# Patient Record
Sex: MALE | Race: WHITE | Employment: FULL TIME | URBAN - METROPOLITAN AREA
[De-identification: names, ages, dates, MRNs, and addresses within clinical notes are randomized per-mention and may not be internally consistent; named-entity substitution may affect disease eponyms.]

---

## 2021-09-30 ENCOUNTER — APPOINTMENT (OUTPATIENT)
Dept: CT IMAGING | Age: 31
End: 2021-09-30
Attending: EMERGENCY MEDICINE
Payer: COMMERCIAL

## 2021-09-30 ENCOUNTER — APPOINTMENT (OUTPATIENT)
Dept: MRI IMAGING | Age: 31
End: 2021-09-30
Attending: EMERGENCY MEDICINE
Payer: COMMERCIAL

## 2021-09-30 ENCOUNTER — HOSPITAL ENCOUNTER (OUTPATIENT)
Age: 31
Setting detail: OBSERVATION
Discharge: HOME | End: 2021-10-02
Attending: EMERGENCY MEDICINE | Admitting: FAMILY MEDICINE
Payer: COMMERCIAL

## 2021-09-30 DIAGNOSIS — R20.0 LEFT ARM NUMBNESS: Primary | ICD-10-CM

## 2021-09-30 DIAGNOSIS — Z82.41 FAMILY HISTORY OF SUDDEN CARDIAC DEATH IN FATHER: ICD-10-CM

## 2021-09-30 DIAGNOSIS — I63.9 CEREBROVASCULAR ACCIDENT (CVA), UNSPECIFIED MECHANISM (HCC): ICD-10-CM

## 2021-09-30 PROBLEM — G45.9 TIA (TRANSIENT ISCHEMIC ATTACK): Status: ACTIVE | Noted: 2021-09-30

## 2021-09-30 LAB
ALBUMIN SERPL-MCNC: 4.3 G/DL (ref 3.5–5)
ALBUMIN/GLOB SERPL: 1.2 {RATIO} (ref 1.1–2.2)
ALP SERPL-CCNC: 69 U/L (ref 45–117)
ALT SERPL-CCNC: 36 U/L (ref 12–78)
ANION GAP SERPL CALC-SCNC: 5 MMOL/L (ref 5–15)
AST SERPL-CCNC: 18 U/L (ref 15–37)
BASOPHILS # BLD: 0 K/UL (ref 0–0.1)
BASOPHILS NFR BLD: 0 % (ref 0–1)
BILIRUB SERPL-MCNC: 0.4 MG/DL (ref 0.2–1)
BUN SERPL-MCNC: 14 MG/DL (ref 6–20)
BUN/CREAT SERPL: 12 (ref 12–20)
CALCIUM SERPL-MCNC: 9.4 MG/DL (ref 8.5–10.1)
CHLORIDE SERPL-SCNC: 107 MMOL/L (ref 97–108)
CK SERPL-CCNC: 131 U/L (ref 39–308)
CK SERPL-CCNC: 150 U/L (ref 39–308)
CK SERPL-CCNC: 91 U/L (ref 39–308)
CO2 SERPL-SCNC: 28 MMOL/L (ref 21–32)
COMMENT, HOLDF: NORMAL
CREAT SERPL-MCNC: 1.19 MG/DL (ref 0.7–1.3)
CRP SERPL-MCNC: <0.29 MG/DL (ref 0–0.6)
DIFFERENTIAL METHOD BLD: ABNORMAL
EOSINOPHIL # BLD: 0.4 K/UL (ref 0–0.4)
EOSINOPHIL NFR BLD: 4 % (ref 0–7)
ERYTHROCYTE [DISTWIDTH] IN BLOOD BY AUTOMATED COUNT: 12.7 % (ref 11.5–14.5)
ERYTHROCYTE [SEDIMENTATION RATE] IN BLOOD: 4 MM/HR (ref 0–15)
GLOBULIN SER CALC-MCNC: 3.7 G/DL (ref 2–4)
GLUCOSE BLD STRIP.AUTO-MCNC: 109 MG/DL (ref 65–117)
GLUCOSE SERPL-MCNC: 110 MG/DL (ref 65–100)
HCT VFR BLD AUTO: 45.3 % (ref 36.6–50.3)
HGB BLD-MCNC: 14.7 G/DL (ref 12.1–17)
IMM GRANULOCYTES # BLD AUTO: 0 K/UL (ref 0–0.04)
IMM GRANULOCYTES NFR BLD AUTO: 0 % (ref 0–0.5)
LYMPHOCYTES # BLD: 4 K/UL (ref 0.8–3.5)
LYMPHOCYTES NFR BLD: 45 % (ref 12–49)
MAGNESIUM SERPL-MCNC: 2.1 MG/DL (ref 1.6–2.4)
MCH RBC QN AUTO: 28.5 PG (ref 26–34)
MCHC RBC AUTO-ENTMCNC: 32.5 G/DL (ref 30–36.5)
MCV RBC AUTO: 87.8 FL (ref 80–99)
MONOCYTES # BLD: 0.5 K/UL (ref 0–1)
MONOCYTES NFR BLD: 5 % (ref 5–13)
NEUTS SEG # BLD: 4 K/UL (ref 1.8–8)
NEUTS SEG NFR BLD: 46 % (ref 32–75)
NRBC # BLD: 0 K/UL (ref 0–0.01)
NRBC BLD-RTO: 0 PER 100 WBC
PLATELET # BLD AUTO: 276 K/UL (ref 150–400)
PMV BLD AUTO: 11.4 FL (ref 8.9–12.9)
POTASSIUM SERPL-SCNC: 3.3 MMOL/L (ref 3.5–5.1)
PROT SERPL-MCNC: 8 G/DL (ref 6.4–8.2)
RBC # BLD AUTO: 5.16 M/UL (ref 4.1–5.7)
SAMPLES BEING HELD,HOLD: NORMAL
SERVICE CMNT-IMP: NORMAL
SODIUM SERPL-SCNC: 140 MMOL/L (ref 136–145)
TROPONIN I SERPL-MCNC: <0.05 NG/ML
TROPONIN I SERPL-MCNC: <0.05 NG/ML
WBC # BLD AUTO: 8.9 K/UL (ref 4.1–11.1)

## 2021-09-30 PROCEDURE — APPNB30 APP NON BILLABLE TIME 0-30 MINS: Performed by: NURSE PRACTITIONER

## 2021-09-30 PROCEDURE — 97165 OT EVAL LOW COMPLEX 30 MIN: CPT | Performed by: OCCUPATIONAL THERAPIST

## 2021-09-30 PROCEDURE — 82550 ASSAY OF CK (CPK): CPT

## 2021-09-30 PROCEDURE — A9576 INJ PROHANCE MULTIPACK: HCPCS

## 2021-09-30 PROCEDURE — 97116 GAIT TRAINING THERAPY: CPT

## 2021-09-30 PROCEDURE — 72156 MRI NECK SPINE W/O & W/DYE: CPT

## 2021-09-30 PROCEDURE — 92610 EVALUATE SWALLOWING FUNCTION: CPT

## 2021-09-30 PROCEDURE — 36415 COLL VENOUS BLD VENIPUNCTURE: CPT

## 2021-09-30 PROCEDURE — 83735 ASSAY OF MAGNESIUM: CPT

## 2021-09-30 PROCEDURE — 80053 COMPREHEN METABOLIC PANEL: CPT

## 2021-09-30 PROCEDURE — 82962 GLUCOSE BLOOD TEST: CPT

## 2021-09-30 PROCEDURE — 85025 COMPLETE CBC W/AUTO DIFF WBC: CPT

## 2021-09-30 PROCEDURE — 86140 C-REACTIVE PROTEIN: CPT

## 2021-09-30 PROCEDURE — 70498 CT ANGIOGRAPHY NECK: CPT

## 2021-09-30 PROCEDURE — 74011250636 HC RX REV CODE- 250/636: Performed by: FAMILY MEDICINE

## 2021-09-30 PROCEDURE — 74011250637 HC RX REV CODE- 250/637: Performed by: EMERGENCY MEDICINE

## 2021-09-30 PROCEDURE — 74011000636 HC RX REV CODE- 636: Performed by: RADIOLOGY

## 2021-09-30 PROCEDURE — 70450 CT HEAD/BRAIN W/O DYE: CPT

## 2021-09-30 PROCEDURE — 74011250637 HC RX REV CODE- 250/637: Performed by: FAMILY MEDICINE

## 2021-09-30 PROCEDURE — 99218 HC RM OBSERVATION: CPT

## 2021-09-30 PROCEDURE — 84484 ASSAY OF TROPONIN QUANT: CPT

## 2021-09-30 PROCEDURE — 74011250636 HC RX REV CODE- 250/636

## 2021-09-30 PROCEDURE — 70553 MRI BRAIN STEM W/O & W/DYE: CPT

## 2021-09-30 PROCEDURE — 97161 PT EVAL LOW COMPLEX 20 MIN: CPT

## 2021-09-30 PROCEDURE — 99285 EMERGENCY DEPT VISIT HI MDM: CPT

## 2021-09-30 PROCEDURE — 97112 NEUROMUSCULAR REEDUCATION: CPT | Performed by: OCCUPATIONAL THERAPIST

## 2021-09-30 PROCEDURE — 85652 RBC SED RATE AUTOMATED: CPT

## 2021-09-30 RX ORDER — ACETAMINOPHEN 650 MG/1
650 SUPPOSITORY RECTAL
Status: DISCONTINUED | OUTPATIENT
Start: 2021-09-30 | End: 2021-10-02 | Stop reason: HOSPADM

## 2021-09-30 RX ORDER — ASPIRIN 325 MG
325 TABLET ORAL ONCE
Status: COMPLETED | OUTPATIENT
Start: 2021-09-30 | End: 2021-09-30

## 2021-09-30 RX ORDER — GUAIFENESIN 100 MG/5ML
81 LIQUID (ML) ORAL DAILY
Status: DISCONTINUED | OUTPATIENT
Start: 2021-09-30 | End: 2021-10-02 | Stop reason: HOSPADM

## 2021-09-30 RX ORDER — FAMOTIDINE 20 MG/1
20 TABLET, FILM COATED ORAL EVERY 12 HOURS
Status: DISCONTINUED | OUTPATIENT
Start: 2021-09-30 | End: 2021-10-02 | Stop reason: HOSPADM

## 2021-09-30 RX ORDER — SODIUM CHLORIDE 9 MG/ML
75 INJECTION, SOLUTION INTRAVENOUS CONTINUOUS
Status: DISPENSED | OUTPATIENT
Start: 2021-09-30 | End: 2021-10-01

## 2021-09-30 RX ORDER — POTASSIUM CHLORIDE 750 MG/1
10 TABLET, FILM COATED, EXTENDED RELEASE ORAL
Status: COMPLETED | OUTPATIENT
Start: 2021-09-30 | End: 2021-09-30

## 2021-09-30 RX ORDER — ATORVASTATIN CALCIUM 40 MG/1
40 TABLET, FILM COATED ORAL
Status: DISCONTINUED | OUTPATIENT
Start: 2021-09-30 | End: 2021-10-02 | Stop reason: HOSPADM

## 2021-09-30 RX ORDER — ACETAMINOPHEN 325 MG/1
650 TABLET ORAL
Status: DISCONTINUED | OUTPATIENT
Start: 2021-09-30 | End: 2021-10-02 | Stop reason: HOSPADM

## 2021-09-30 RX ADMIN — ATORVASTATIN CALCIUM 40 MG: 40 TABLET, FILM COATED ORAL at 21:38

## 2021-09-30 RX ADMIN — ASPIRIN 325 MG ORAL TABLET 325 MG: 325 PILL ORAL at 05:39

## 2021-09-30 RX ADMIN — FAMOTIDINE 20 MG: 20 TABLET ORAL at 11:34

## 2021-09-30 RX ADMIN — SODIUM CHLORIDE 75 ML/HR: 900 INJECTION, SOLUTION INTRAVENOUS at 11:33

## 2021-09-30 RX ADMIN — POTASSIUM CHLORIDE 10 MEQ: 750 TABLET, FILM COATED, EXTENDED RELEASE ORAL at 11:34

## 2021-09-30 RX ADMIN — IOPAMIDOL 100 ML: 755 INJECTION, SOLUTION INTRAVENOUS at 06:08

## 2021-09-30 RX ADMIN — ASPIRIN 81 MG CHEWABLE TABLET 81 MG: 81 TABLET CHEWABLE at 11:34

## 2021-09-30 RX ADMIN — GADOTERIDOL 17 ML: 279.3 INJECTION, SOLUTION INTRAVENOUS at 07:52

## 2021-09-30 RX ADMIN — FAMOTIDINE 20 MG: 20 TABLET ORAL at 21:38

## 2021-09-30 NOTE — ED TRIAGE NOTES
Pt arrives ambulatory with CC of left arm numbness that started an hour ago. Pt states he also had an episode of shivering when he woke up that lasted 10-15 minutes. Pt thinks he may have had an anxiety attack, and has a history of anxiety. Pt appears diaphoretic and shaky.

## 2021-09-30 NOTE — ED PROVIDER NOTES
HPI   67-year-old male presents with left arm numbness onset upon awakening prior to arrival in ED. Patient went to bed at 10 PM and was well. When he woke up the medial aspect of his left upper extremity from his left shoulder to his left hand was numb and also some mild numbness to the lateral aspect of his left arm. Denies headache, neck pain, chest pain, shortness of breath, fever, cough, weakness. No history of similar symptoms. Occasionally smokes marijuana, occasional EtOH. No past medical history on file. No past surgical history on file. No family history on file. Social History     Socioeconomic History    Marital status: Not on file     Spouse name: Not on file    Number of children: Not on file    Years of education: Not on file    Highest education level: Not on file   Occupational History    Not on file   Tobacco Use    Smoking status: Not on file   Substance and Sexual Activity    Alcohol use: Not on file    Drug use: Not on file    Sexual activity: Not on file   Other Topics Concern    Not on file   Social History Narrative    Not on file     Social Determinants of Health     Financial Resource Strain:     Difficulty of Paying Living Expenses:    Food Insecurity:     Worried About Running Out of Food in the Last Year:     [address] in the Last Year:    Transportation Needs:     Lack of Transportation (Medical):      Lack of Transportation (Non-Medical):    Physical Activity:     Days of Exercise per Week:     Minutes of Exercise per Session:    Stress:     Feeling of Stress :    Social Connections:     Frequency of Communication with Friends and Family:     Frequency of Social Gatherings with Friends and Family:     Attends Gnosticist Services:     Active Member of Clubs or Organizations:     Attends Club or Organization Meetings:     Marital Status:    Intimate Partner Violence:     Fear of Current or Ex-Partner:     Emotionally Abused:     Physically Abused:     Sexually Abused: ALLERGIES: Patient has no known allergies. Review of Systems   Constitutional: Negative for chills and fever. Respiratory: Negative for cough and shortness of breath. Cardiovascular: Negative for chest pain. Gastrointestinal: Negative for abdominal pain, diarrhea, nausea and vomiting. Genitourinary: Negative for dysuria. Musculoskeletal: Negative for back pain, neck pain and neck stiffness. Skin: Negative for rash. Neurological: Positive for numbness. Negative for weakness and headaches. All other systems reviewed and are negative.       Vitals:    09/30/21 0356   BP: (!) 151/90   Pulse: 85   Resp: 18   Temp: 97.8 °F (36.6 °C)   SpO2: 100%            Physical Exam   Physical Examination: General appearance - alert, well appearing, and in no distress, oriented to person, place, and time and normal appearing weight, anxious, diaphoretic  Eyes - pupils equal and reactive, extraocular eye movements intact  Neck - supple, no significant adenopathy  Chest - clear to auscultation, no wheezes, rales or rhonchi, symmetric air entry  Heart - normal rate, regular rhythm, normal S1, S2, no murmurs, rubs, clicks or gallops  Abdomen - soft, nontender, nondistended, no masses or organomegaly  Back exam - full range of motion, no tenderness, palpable spasm or pain on motion  Neurological - alert, oriented, normal speech, no focal findings or movement disorder noted, normal f-n-f, no nystagmus, no pronator drift  Musculoskeletal - no joint tenderness, deformity or swelling  Extremities - peripheral pulses normal, no pedal edema, no clubbing or cyanosis  Skin - normal coloration and turgor, no rashes, no suspicious skin lesions noted  MDM  Number of Diagnoses or Management Options     Amount and/or Complexity of Data Reviewed  Clinical lab tests: ordered and reviewed  Tests in the radiology section of CPT®: ordered and reviewed  Discuss the patient with other providers: yes (Teleneurology, ED physician)  Independent visualization of images, tracings, or specimens: yes    Patient Progress  Patient progress: improved         Procedures    4:44 AM  Discussed with Dr. Brenda Hermosillo, teleneurology. Recommends MRI brain and C-spine and CTA head neck to evaluate for acute process. Not TPA or interventional candidate. If imaging is unremarkable patient can follow-up outpatient for EMG study with neurology. 7:00 AM  Pt signed out to Dr. Montserrat Bunch pending imaging results, reevaluation and disposition. Perfect Serve Consult for Admission  10:34 AM    ED Room Number: ER10/10  Patient Name and age:  Richard Valadez 35 y.o.  male  Working Diagnosis:   1. Left arm numbness    2. Cerebrovascular accident (CVA), unspecified mechanism ([address]        COVID-19 Suspicion:  no  Sepsis present:  no  Reassessment needed: no  Code Status:  Full Code  Readmission: no  Isolation Requirements:  no  Recommended Level of Care:  telemetry  Department:  [facility] Adult ED - 21     Other:  Woke up this morning with persistent numbness in L arm. MRI showed R sided cortical infarct. Total critical care time spent exclusive of procedures:  0 minutes.

## 2021-09-30 NOTE — PROGRESS NOTES
Care Management:    Transition of Care Plan:     · RUR: NA observation  · DX: subacute right parietal CVA  · PT/OT/speech - no therapy needed after discharge  · Disposition: home  · Transportation: self or friend      Met with patient in the room. He confirmed his address, phone number, and mother as his emergency contact. Patient lives in a 2 level apartment with 2 steps to enter. Bedrooms are on second floor. DME: none  ADLs: independent  Pharmacy: Does not take any medications. Previous HH/SNF/rehab: None  Insurance: [employer]    Observation notice provided in writing to patient and/or caregiver as well as verbal explanation of the policy. Patients who are in outpatient status also receive the Observation notice. Patient has received notice and or patient representative has received in person. Reason for Admission:  subacute right parietal CVA                   RUR Score:   NA observation                  Plan for utilizing home health:     No needs     PCP: First and Last name:  None     Name of Practice:    Are you a current patient: Yes/No:    Approximate date of last visit:    Can you participate in a virtual visit with your PCP:                     Current Advanced Directive/Advance Care Plan: Full Code  Patient would like his mother to be his decision maker. This is his legal next of kin. Healthcare Decision Maker:   Primary - [place] [phone]                Care Management Interventions  PCP Verified by CM: No (Does not have a PCP.)  Palliative Care Criteria Met (RRAT>21 & CHF Dx)?: No  Mode of Transport at Discharge: Other (see comment) (friend or self )  Transition of Care Consult (CM Consult):  Other  Discharge Durable Medical Equipment: No  Physical Therapy Consult: Yes  Occupational Therapy Consult: Yes  Speech Therapy Consult: Yes  Support Systems: Other Family Member(s)  Confirm Follow Up Transport: Self  Muse Resource Information Provided?: No  Discharge Location  Discharge Placement: [facility], [facility] [place]

## 2021-09-30 NOTE — ED NOTES
Bedside and Verbal shift change report given to Lucio Doty RN (oncoming nurse) by Lucas Strickland RN (offgoing nurse). Report included the following information SBAR, ED Summary and MAR.

## 2021-09-30 NOTE — PROGRESS NOTES
OCCUPATIONAL THERAPY EVALUATION/DISCHARGE  Patient: Adelaida Keller (87 y.o. male)  Date: 9/30/2021  Primary Diagnosis: TIA (transient ischemic attack) [G45.9]       Precautions:   (standard)    ASSESSMENT  Based on the objective data described below, the patient presents with numbness LUE, pt states mostly lateral arm shoulder to elbow with MRI positive for small/punctate cortical subacute infarction of the  right parietal lobe. He demonstrates good safety awareness, no LOB, symmetrical strength and coordination BUEs and no functional deficits. Educated pt on signs and symptoms of stroke using BE FAST and he was able to recall 4/6 from memory. No further skilled OT required at this time. Current Level of Function (ADLs/self-care): Independent    Functional Outcome Measure: The patient scored Total A-D  Total A-D (Motor Function): 66/66 on the Fugl-Yanes Assessment which is indicative of no impairment in upper extremity functional status. Other factors to consider for discharge: see above     PLAN :  Recommendation for discharge: (in order for the patient to meet his/her long term goals)  No skilled occupational therapy/ follow up rehabilitation needs identified at this time. This discharge recommendation:  Has not yet been discussed the attending provider and/or case management    IF patient discharges home will need the following DME: none       SUBJECTIVE:   Patient stated Do you think posture caused this?     OBJECTIVE DATA SUMMARY:   HISTORY:   No past medical history on file. No past surgical history on file.     Prior Level of Function/Environment/Context: Independent,active, drives, works in IT  Expanded or extensive additional review of patient history:     Home Situation  Home Environment: Apartment (townhouse)  # Steps to Enter: 1  One/Two Story Residence: Two story  # of Interior Steps: 14  Living Alone: No  Support Systems: Spouse/Significant Other, Parent(s) (lives with girlfriend)  Patient Expects to be Discharged to[de-identified] Apartment  Current DME Used/Available at Home: None  Tub or Shower Type: Tub/Shower combination    Hand dominance: Right    EXAMINATION OF PERFORMANCE DEFICITS:  Cognitive/Behavioral Status:  Neurologic State: Alert; Appropriate for age  Orientation Level: Oriented X4  Cognition: Appropriate decision making; Appropriate for age attention/concentration; Appropriate safety awareness; Follows commands  Perception: Appears intact  Perseveration: No perseveration noted  Safety/Judgement: Awareness of environment;Driving appropriateness; Fall prevention;Home safety;Good awareness of safety precautions; Insight into deficits    Hearing: Auditory  Auditory Impairment: None    Vision/Perceptual:    Acuity: Within Defined Limits    Corrective Lenses: Contacts    Range of Motion:  AROM: Within functional limits  PROM: Within functional limits                      Strength:  Strength: Within functional limits (BUEs/LEs 5/5)                Coordination:  Coordination: Within functional limits  Fine Motor Skills-Upper: Left Intact; Right Intact    Gross Motor Skills-Upper: Left Intact; Right Intact    Tone & Sensation:  Tone: Normal  Sensation: Impaired (c/o numbness LUE- INT to light touch)                      Balance:  Sitting: Intact  Standing: Intact    Functional Mobility and Transfers for ADLs:  Bed Mobility:  Rolling: Independent  Supine to Sit: Independent  Sit to Supine: Independent  Scooting: Independent    Transfers:  Sit to Stand: Independent  Stand to Sit: Independent  Bed to Chair: Independent  Bathroom Mobility: Independent  Toilet Transfer : Independent    ADL Assessment:  Feeding: Independent    Oral Facial Hygiene/Grooming: Independent    Bathing: Independent    Upper Body Dressing: Independent    Lower Body Dressing: Independent    Toileting: Independent    Cognitive Retraining  Safety/Judgement: Awareness of environment;Driving appropriateness; Fall prevention;Home safety;Good awareness of safety precautions; Insight into deficits    Functional Measure:  Fugl-Yanes Assessment of Motor Recovery after Stroke: BUEs  Reflex Activity  Flexors/Biceps/Fingers: Can be elicited  Extensors/Triceps: Can be elicited  Reflex Subtotal: 4    Volitional Movement Within Synergies  Shoulder Retraction: Full  Shoulder Elevation: Full  Shoulder Abduction (90 degrees): Full  Shoulder External Rotation: Full  Elbow Flexion: Full  Forearm Supination: Full  Shoulder Adduction/Internal Rotation: Full  Elbow Extension: Full  Forearm Pronation: Full  Subtotal: 18    Volitional Movement Mixing Synergies  Hand to Lumbar Spine: Full  Shoulder Flexion (0-90 degrees): Full  Pronation-Supination: Full  Subtotal: 6    Volitional Movement With Little or No Synergy  Shoulder Abduction (0-90 degrees): Full  Shoulder Flexion ( degrees): Full  Pronation/Supination: Full  Subtotal : 6    Normal Reflex Activity  Biceps, Triceps, Finger Flexors: Full  Subtotal : 2    Upper Extremity Total   Upper Extremity Total: 36    Wrist  Stability at 15 Degree Dorsiflexion: Full  Repeated Dorsiflexion/ Volar Flexion: Full  Stability at 15 Degree Dorsiflexion: Full  Repeated Dorsiflexion/ Volar Flexion: Full  Circumduction: Full  Wrist Total: 10    Hand  Mass Flexion: Full  Mass Extension: Full  Grasp A: Full  Grasp B: Full  Grasp C: Full  Grasp D: Full  Grasp E: Full  Hand Total: 14    Coordination/Speed  Tremor: None  Dysmetria: None  Time: <1s (BUEs 4 sec each)  Coordination/Speed Total : 6    Total A-D  Total A-D (Motor Function): 66/66     This is a reliable/valid measure of arm function after a neurological event. It has established value to characterize functional status and for measuring spontaneous and therapy-induced recovery; tests proximal and distal motor functions. Fugl-Yanes Assessment - UE scores recorded between five and 30 days post neurologic event can be used to predict UE recovery at six months post neurologic event.   Severe = 0-21 points   Moderately Severe = 22-33 points   Moderate = 34-47 points   Mild = 48-66 points  JACEY Harman, JOSE Schaefer, & KAELYN Hill (1992). Measurement of motor recovery after stroke: Outcome assessment and sample size requirements. Stroke, 23, pp. 2651-5656.   ------------------------------------------------------------------------------------------------------------------------------------------------------------------  MCID:  Stroke:   Wilkins Bloch et al, 2001; n = 171; mean age 79 (6) years; assessed within 16 (12) days of stroke, Acute Stroke)  FMA Motor Scores from Admission to Discharge   10 point increase in FMA Upper Extremity = 1.5 change in discharge FIM   10 point increase in FMA Lower Extremity = 1.9 change in discharge FIM  MDC:   Stroke:   Antwan Segal et al, 2008, n = 14, mean age = 59.9 (14.6) years, assessed on average 14 (6.5) months post stroke, Chronic Stroke)   FMA = 5.2 points for the Upper Extremity portion of the assessment     Occupational Therapy Evaluation Charge Determination   History Examination Decision-Making   LOW Complexity : Brief history review  LOW Complexity : 1-3 performance deficits relating to physical, cognitive , or psychosocial skils that result in activity limitations and / or participation restrictions  LOW Complexity : No comorbidities that affect functional and no verbal or physical assistance needed to complete eval tasks       Based on the above components, the patient evaluation is determined to be of the following complexity level: LOW   Pain Ratin/10    Activity Tolerance:   Good    After treatment patient left in no apparent distress:    Supine in bed and Call bell within reach    COMMUNICATION/EDUCATION:   The patients plan of care was discussed with: Physical therapist, Speech therapist and Registered nurse.      Patient was educated regarding his deficit(s) of numbness LUE as this relates to his diagnosis of mmall/punctate cortical subacute infarction of the right parietal lobe per MRI. He demonstrated Good understanding as evidenced by awareness of situation. Patient and/or family was verbally educated on the BE FAST acronym for signs/symptoms of CVA and TIA. BE FAST was written on patient's communication board  for visual education and reinforcement. All questions answered with patient indicating good understanding.      Thank you for this referral.  Kay Padilla OT  Time Calculation: 19 mins

## 2021-09-30 NOTE — PROGRESS NOTES
Neurocritical Care Code Stroke Documentation    Symptoms:   woke up this am with left arm numbness   Last Known Well:   when he went to bed   Medical hx: Active Problems:    * No active hospital problems. *     Anticoagulation: none   VAN:  Negative   NIHSS:   1a-LOC:0    1b-Month/Age:0    1c-Open/Close Hand:0    2-Best Gaze:0    3-Visual Fields:0    4-Facial Palsy:0    5a-Left Arm:0    5b-Right Arm:0    6a-Left Le    6b-Right Le    7-Limb Ataxia:0    8-Sensory:1    9-Best Language:0    10-Dysarthria:0    11-Extinction/Inattention:0  TOTAL SCORE:1   Imaging:   CT head negative for acute findings   Plan:   TPA Candidate: NO    Mechanical thrombectomy Candidate: NO     Discussed with: Dr. Marian Byrne and ED RN. Arrival time: 0413  Time spent: 30 minutes.      Jose Carlos Dimas NP  Neurocritical Care Nurse Practitioner  [phone]

## 2021-09-30 NOTE — PROGRESS NOTES
SPEECH PATHOLOGY BEDSIDE SWALLOW EVALUATION/DISCHARGE  Patient: Daron Calvert (22 y.o. male)  Date: 9/30/2021  Primary Diagnosis: TIA (transient ischemic attack) [G45.9]       Precautions:    (standard)    ASSESSMENT :  Based on the objective data described below, the patient presents with functional oropharyngeal swallow with no difficulties or s/s of aspiration appreciated at bedside with any consistencies. Patient denies any speech or swallowing concerns. Recommend regular diet/thin liquids with general aspiration precautions. Suspect pt is at baseline swallow function and therefore, skilled acute therapy provided by a speech-language pathologist is not indicated at this time. SLP will sign off. Please reconsult with any changes or if SLP can be of any further assistance.   . PLAN :  Recommendations:  -- regular diet/ thin liquids  -- general aspiration precautions including completely upright for all PO   -- SLP will sign off     Discharge Recommendations: None     SUBJECTIVE:   Patient stated, \"I'm okay. OBJECTIVE:   No past medical history on file. No past surgical history on file.   Prior Level of Function/Home Situation:   Home Situation  Home Environment: Apartment ([facility])  # Steps to Enter: 1  One/Two Story Residence: Two story  # of Interior Steps: 14  Living Alone: No  Support Systems: Spouse/Significant Other, Parent(s) (lives with girlfriend)  Patient Expects to be Discharged to[de-identified] Apartment  Current DME Used/Available at Home: None  Tub or Shower Type: Tub/Shower combination  Diet prior to admission: regular diet/thin liquids  Current Diet:  Regular diet/thin liquids  Cognitive and Communication Status:  Neurologic State: Alert  Orientation Level: Oriented X4  Cognition: Appropriate decision making, Appropriate for age attention/concentration, Appropriate safety awareness  Perception: Appears intact  Perseveration: No perseveration noted  Safety/Judgement: Awareness of environment  Oral Assessment:  Oral Assessment  Labial: No impairment  Dentition: Natural  Oral Hygiene: oral mucosa moist and clear of secretions  Lingual: No impairment  Velum: No impairment  Mandible: No impairment  P.O. Trials:  Patient Position: upright in bed  Vocal quality prior to P.O.: No impairment  Consistency Presented: Thin liquid; Solid  How Presented: Self-fed/presented;Cup/sip;Spoon;Straw;Successive swallows     Bolus Acceptance: No impairment  Bolus Formation/Control: No impairment     Propulsion: No impairment  Oral Residue: None  Initiation of Swallow: No impairment  Laryngeal Elevation: Functional  Aspiration Signs/Symptoms: None  Pharyngeal Phase Characteristics: No impairment, issues, or problems   Effective Modifications: None          Oral Phase Severity: No impairment  Pharyngeal Phase Severity : No impairment  NOMS:   The NOMS functional outcome measure was used to quantify this patient's level of swallowing impairment. Based on the NOMS, the patient was determined to be at level 7 for swallow function     NOMS Swallowing Levels:  Level 1 (CN): NPO  Level 2 (CM): NPO but takes consistency in therapy  Level 3 (CL): Takes less than 50% of nutrition p.o. and continues with nonoral feedings; and/or safe with mod cues; and/or max diet restriction  Level 4 (CK): Safe swallow but needs mod cues; and/or mod diet restriction; and/or still requires some nonoral feeding/supplements  Level 5 (CJ): Safe swallow with min diet restriction; and/or needs min cues  Level 6 (CI): Independent with p.o.; rare cues; usually self cues; may need to avoid some foods or needs extra time  Level 7 ([address] [place] [address]): Independent for all p.o.  BEE. (2003). National Outcomes Measurement System (NOMS): Adult Speech-Language Pathology User's Guide.        Pain:  Pain Scale 1: Numeric (0 - 10)  Pain Intensity 1: 0     After treatment:   Call bell within reach and Nursing notified    COMMUNICATION/EDUCATION:     The patient's plan of care including recommendations, planned interventions, and recommended diet changes were discussed with: Registered nurse.      Thank you for this referral.  JESS Merino  Time Calculation: 15 mins

## 2021-09-30 NOTE — PROGRESS NOTES
SLP Contact Note    SLP evaluation complete. Pt cleared for regular diet/thin liquids. No speech/language/cognition concerns. Full note to follow.       Thank you,  RASHMI BowmanEd, [address] [facility]  Speech-Language Pathologist

## 2021-09-30 NOTE — H&P
History & Physical    Primary Care Provider: None  Source of Information: Patient     History of Presenting Illness:   Yinka Pugh is a 35 y.o. male who presents with left arm numbness    History was primarily obtained from the patient    Patient reports he went to bed at 10 PM last night, was feeling well, reports that he woke up with numbness on the medial side of his left arm going up to his triceps. Patient reports that he felt some weakness in his left arm, got concerned and decided to come to the hospital.  Patient denies any other complaints or problems. Patient was seen in the ER and was requested to be admitted to the hospitalist service. The patient denies any Headache, blurry vision, sore throat, trouble swallowing, trouble with speech, chest pain, SOB, cough, fever, chills, N/V/D, abd pain, urinary symptoms, constipation, recent travels, sick contacts, focal or generalized neurological symptoms,  falls, injuries, rashes, contact with COVID-19 diagnosed patients, hematemesis, melena, hemoptysis, hematuria, rashes, denies starting any new medications and denies any other concerns or problems besides as mentioned above. Review of Systems:  A comprehensive review of systems was negative except for that written in the History of Present Illness. All other systems reviewed, pertinent positives and negatives noted in HPI    No past medical history on file. No past surgical history on file. Prior to Admission medications    Not on File     No Known Allergies   No family history on file. Family history was discussed with the patient, all pertinent and relevant details are mentioned as above, no other pertinent and relevant family history was noted on my discussion with the patient.   Patient specifically denies any history of Gaucher disease in the family  SOCIAL HISTORY:  Patient resides:  Independently X   Assisted Living    SNF    With family care Smoking history:   None    Former X   Chronic      Alcohol history:   None    Social X   Chronic      Ambulates:   Independently X   w/cane    w/walker    w/wc    CODE STATUS:  DNR    Full X   Other    SMOKES marijuana occasionally  Objective:     Physical Exam:     Visit Vitals  /83   Pulse 66   Temp 98.5 °F (36.9 °C)   Resp 18   SpO2 99%      O2 Device: None (Room air)    General : alert x 3, awake, no acute distress, resting in bed, pleasant male, appears to be stated age  HEENT: PEERL, EOMI, moist mucus membrane, TM clear  Neck: supple, no JVD, no meningeal signs  Chest: Clear to auscultation bilaterally   CVS: S1 S2 heard, Capillary refill less than 2 seconds  Abd: soft/ Non tender, non distended, BS physiological,   Ext: no clubbing, no cyanosis, no edema, brisk 2+ DP pulses  Neuro/Psych: pleasant mood and affect, CN 2-12 grossly intact, paresthesia left upper extremity, strength 5/5 in all extremities, DTR 1+ x 4  Skin: warm     EKG: EKG pending      Data Review:     Recent Days:  Recent Labs     09/30/21  0408   WBC 8.9   HGB 14.7   HCT 45.3        Recent Labs     09/30/21  0408      K 3.3*      CO2 28   *   BUN 14   CREA 1.19   CA 9.4   MG 2.1   ALB 4.3   ALT 36     No results for input(s): PH, PCO2, PO2, HCO3, FIO2 in the last 72 hours. 24 Hour Results:  Recent Results (from the past 24 hour(s))   SAMPLES BEING HELD    Collection Time: 09/30/21  4:03 AM   Result Value Ref Range    SAMPLES BEING HELD 1BLU,1RED     COMMENT        Add-on orders for these samples will be processed based on acceptable specimen integrity and analyte stability, which may vary by analyte.    CBC WITH AUTOMATED DIFF    Collection Time: 09/30/21  4:08 AM   Result Value Ref Range    WBC 8.9 4.1 - 11.1 K/uL    RBC 5.16 4.10 - 5.70 M/uL    HGB 14.7 12.1 - 17.0 g/dL    HCT 45.3 36.6 - 50.3 %    MCV 87.8 80.0 - 99.0 FL    MCH 28.5 26.0 - 34.0 PG    MCHC 32.5 30.0 - 36.5 g/dL    RDW 12.7 11.5 - 14.5 % PLATELET 515 808 - 178 K/uL    MPV 11.4 8.9 - 12.9 FL    NRBC 0.0 0  WBC    ABSOLUTE NRBC 0.00 0.00 - 0.01 K/uL    NEUTROPHILS 46 32 - 75 %    LYMPHOCYTES 45 12 - 49 %    MONOCYTES 5 5 - 13 %    EOSINOPHILS 4 0 - 7 %    BASOPHILS 0 0 - 1 %    IMMATURE GRANULOCYTES 0 0.0 - 0.5 %    ABS. NEUTROPHILS 4.0 1.8 - 8.0 K/UL    ABS. LYMPHOCYTES 4.0 (H) 0.8 - 3.5 K/UL    ABS. MONOCYTES 0.5 0.0 - 1.0 K/UL    ABS. EOSINOPHILS 0.4 0.0 - 0.4 K/UL    ABS. BASOPHILS 0.0 0.0 - 0.1 K/UL    ABS. IMM. GRANS. 0.0 0.00 - 0.04 K/UL    DF AUTOMATED     METABOLIC PANEL, COMPREHENSIVE    Collection Time: 09/30/21  4:08 AM   Result Value Ref Range    Sodium 140 136 - 145 mmol/L    Potassium 3.3 (L) 3.5 - 5.1 mmol/L    Chloride 107 97 - 108 mmol/L    CO2 28 21 - 32 mmol/L    Anion gap 5 5 - 15 mmol/L    Glucose 110 (H) 65 - 100 mg/dL    BUN 14 6 - 20 MG/DL    Creatinine 1.19 0.70 - 1.30 MG/DL    BUN/Creatinine ratio 12 12 - 20      GFR est AA >60 >60 ml/min/1.73m2    GFR est non-AA >60 >60 ml/min/1.73m2    Calcium 9.4 8.5 - 10.1 MG/DL    Bilirubin, total 0.4 0.2 - 1.0 MG/DL    ALT (SGPT) 36 12 - 78 U/L    AST (SGOT) 18 15 - 37 U/L    Alk.  phosphatase 69 45 - 117 U/L    Protein, total 8.0 6.4 - 8.2 g/dL    Albumin 4.3 3.5 - 5.0 g/dL    Globulin 3.7 2.0 - 4.0 g/dL    A-G Ratio 1.2 1.1 - 2.2     CK    Collection Time: 09/30/21  4:08 AM   Result Value Ref Range     39 - 308 U/L   C REACTIVE PROTEIN, QT    Collection Time: 09/30/21  4:08 AM   Result Value Ref Range    C-Reactive protein <0.29 0.00 - 0.60 mg/dL   MAGNESIUM    Collection Time: 09/30/21  4:08 AM   Result Value Ref Range    Magnesium 2.1 1.6 - 2.4 mg/dL   SED RATE (ESR)    Collection Time: 09/30/21  4:08 AM   Result Value Ref Range    Sed rate, automated 4 0 - 15 mm/hr   GLUCOSE, POC    Collection Time: 09/30/21  4:27 AM   Result Value Ref Range    Glucose (POC) 109 65 - 117 mg/dL    Performed by Qiana Wilcox          Imaging:     MRI BRAIN W WO CONT    Result Date: 9/30/2021  Small/punctate cortical subacute infarction of the  right parietal lobe. There is no associated hemorrhage, midline shift or mass effect. No intracranial mass, hemorrhage . MRI CERV SPINE W WO CONT    Result Date: 9/30/2021  Essentially normal MRI of the cervical spine. No evidence of cervical cord demyelinating disease. No evidence of canal or foraminal compromise. CT CODE NEURO HEAD WO CONTRAST    Result Date: 9/30/2021  No acute findings. CTA HEAD NECK W CONT    Result Date: 9/30/2021  No evidence of large vessel occlusion or hemodynamically significant carotid stenosis. Assessment/Plan     Subacute right parietal CVA  Hypokalemia  Marijuana abuse    Patient will be admitted on a telemetry bed, start patient on aspirin, statin, neurology consult, echocardiogram, PT OT speech consult, hemoglobin A1c, telemetry, supportive care close monitoring and further intervention per hospital course, monitor  Replace potassium  Patient was counseled on marijuana abuse    GI DVT prophylaxis: Patient will be on SCDs             Please note that this dictation was completed with [employer], the computer voice recognition software. Quite often unanticipated grammatical, syntax, homophones, and other interpretive errors are inadvertently transcribed by the computer software. Please disregard these errors. Please excuse any errors that have escaped final proofreading.          Signed By: Cee Weldon MD     September 30, 2021

## 2021-09-30 NOTE — ED NOTES
Resulted care of patient. Cardiac Monitor applied. IV infusing via pump. Pt alert and oriented with callbell in reach. Pt denies pain, feeling anxious about the dx. Pt provided with emotional support, offered to call his mother and discuss with her if he would like.

## 2021-09-30 NOTE — PROGRESS NOTES
[facility] PHYSICAL THERAPY EVALUATION WITH DISCHARGE  Patient: Tiffanie Shelton (82 y.o. male)  Date: 9/30/2021  Primary Diagnosis: TIA (transient ischemic attack) [G45.9]       Precautions:    (standard)      ASSESSMENT  Based on the objective data described below, the patient presents with reports of LUE numbness s/p admission on 9/30 for CVA work up. Brain MRI: small subacute R parietal lobe. Pt received supine in stretcher; evaluated in ER. Pt demonstrated intact LE strength, LE coordination and LE sensation. Pt still reported L UE numbness(See OT note for details). Pt completed bed mobility, functional mobility, and gait training without LOB or apparent abnormalities. Pt does not require any further acute PT needs at this time or follow up    Functional Outcome Measure: The patient scored Total: 56/56 on the Morales Balance Assessment which is indicative of low fall risk. Other factors to consider for discharge: none     Further skilled acute physical therapy is not indicated at this time. PLAN :  Recommendation for discharge: (in order for the patient to meet his/her long term goals)  No skilled physical therapy/ follow up rehabilitation needs identified at this time. This discharge recommendation:  Has been made in collaboration with the attending provider and/or case management    IF patient discharges home will need the following DME: none         SUBJECTIVE:   Patient stated I sit a lot. Could it be due to my posture? Leonardo Ferro    OBJECTIVE DATA SUMMARY:   HISTORY:    No past medical history on file. No past surgical history on file.     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  Personal factors and/or comorbidities impacting plan of care:     Home Situation  Home Environment: Apartment ([facility])  # Steps to Enter: 1  One/Two Story Residence: Two story  # of Interior Steps: 14  Living Alone: No  Support Systems: Spouse/Significant Other, Parent(s) (lives with girlfriend)  Patient Expects to be Discharged to[de-identified] Apartment  Current DME Used/Available at Home: None  Tub or Shower Type: Tub/Shower combination    EXAMINATION/PRESENTATION/DECISION MAKING:   Critical Behavior:  Neurologic State: Alert, Appropriate for age  Orientation Level: Oriented X4  Cognition: Appropriate decision making, Appropriate for age attention/concentration, Appropriate safety awareness, Follows commands  Safety/Judgement: Awareness of environment, Driving appropriateness, Fall prevention, Home safety, Good awareness of safety precautions, Insight into deficits  Hearing: Auditory  Auditory Impairment: None  Skin:   Edema:   Range Of Motion:  AROM: Within functional limits           PROM: Within functional limits           Strength:    Strength: Within functional limits (BUEs/LEs 5/5)                    Tone & Sensation:   Tone: Normal              Sensation: Impaired (c/o numbness LUE- INT to light touch)               Coordination:  Coordination: Within functional limits  Vision:   Acuity: Within Defined Limits  Corrective Lenses: Contacts  Functional Mobility:  Bed Mobility:  Rolling: Independent  Supine to Sit: Independent  Sit to Supine: Independent  Scooting: Independent  Transfers:  Sit to Stand: Independent  Stand to Sit: Independent        Bed to Chair: Independent              Balance:   Sitting: Intact  Standing: Intact  Ambulation/Gait Training:  Distance (ft): 20 Feet (ft)  Assistive Device: Gait belt  Ambulation - Level of Assistance: Independent                                                Stairs:               Therapeutic Exercises:       Functional Measure  Morales Balance Test:    Sitting to Standin  Standing Unsupported: 4  Sitting with Back Unsupported: 4  Standing to Sittin  Transfers: 4  Standing Unsupported with Eyes Closed: 4  Standing Unsupported with Feet Together: 4  Reach Forward with Outstretched Arm: 4   Object: 4  Turn to Look Over Shoulders: 4  Turn 360 Degrees: 4  Alternate Foot on Step/Stool: 4  Standing Unsupported One Foot in Front: 4  Stand on One Le  Total: 56/56         56=Maximum possible score;   0-20=High fall risk  21-40=Moderate fall risk   41-56=Low fall risk        Based on the above components, the patient evaluation is determined to be of the following complexity level: LOW     Pain Rating:  Pt denied pain    Activity Tolerance:   Good    After treatment patient left in no apparent distress:   Supine in bed, Call bell within reach and Side rails x 3    COMMUNICATION/EDUCATION:   The patients plan of care was discussed with: Occupational therapist, Speech therapist and Registered nurse. Patient was educated regarding His deficit(s) of L UE numbness as this relates to His diagnosis of R subacute infarct R parietal lobe. He demonstrated Good understanding. Patient and/or family was verbally educated on the BE FAST acronym for signs/symptoms of CVA and TIA. BE FAST was written on patient's communication board  for visual education and reinforcement. All questions answered with patient indicating good understanding. Fall prevention education was provided and the patient/caregiver indicated understanding., Patient/family have participated as able in goal setting and plan of care. and Patient/family agree to work toward stated goals and plan of care.     Thank you for this referral.  Chai Pollack, PT, DPT   Time Calculation: 18 mins

## 2021-09-30 NOTE — ED NOTES
Pt provided with snacks. Concerned about IV, site checked + blood return. Light down and door shut. Pt aware to call if needed.

## 2021-10-01 ENCOUNTER — APPOINTMENT (OUTPATIENT)
Dept: NON INVASIVE DIAGNOSTICS | Age: 31
End: 2021-10-01
Attending: FAMILY MEDICINE
Payer: COMMERCIAL

## 2021-10-01 ENCOUNTER — APPOINTMENT (OUTPATIENT)
Dept: NON INVASIVE DIAGNOSTICS | Age: 31
End: 2021-10-01
Attending: NURSE PRACTITIONER
Payer: COMMERCIAL

## 2021-10-01 LAB
AMPHET UR QL SCN: NEGATIVE
ANION GAP SERPL CALC-SCNC: 4 MMOL/L (ref 5–15)
BARBITURATES UR QL SCN: NEGATIVE
BENZODIAZ UR QL: NEGATIVE
BUN SERPL-MCNC: 10 MG/DL (ref 6–20)
BUN/CREAT SERPL: 12 (ref 12–20)
CALCIUM SERPL-MCNC: 7.6 MG/DL (ref 8.5–10.1)
CANNABINOIDS UR QL SCN: POSITIVE
CHLORIDE SERPL-SCNC: 114 MMOL/L (ref 97–108)
CHOLEST SERPL-MCNC: 131 MG/DL
CO2 SERPL-SCNC: 26 MMOL/L (ref 21–32)
COCAINE UR QL SCN: NEGATIVE
COMMENT, HOLDF: NORMAL
CREAT SERPL-MCNC: 0.83 MG/DL (ref 0.7–1.3)
CRP SERPL HS-MCNC: 0.5 MG/L
DRUG SCRN COMMENT,DRGCM: ABNORMAL
ECHO AO ROOT DIAM: 2.66 CM
ECHO AV AREA PEAK VELOCITY: 2.83 CM2
ECHO AV AREA/BSA PEAK VELOCITY: 1.3 CM2/M2
ECHO AV PEAK GRADIENT: 8.11 MMHG
ECHO AV PEAK VELOCITY: 142.4 CM/S
ECHO LA AREA 4C: 17.07 CM2
ECHO LA MAJOR AXIS: 3.43 CM
ECHO LA MINOR AXIS: 1.63 CM
ECHO LA VOL 2C: 43.76 ML (ref 18–58)
ECHO LA VOL 4C: 41.52 ML (ref 18–58)
ECHO LA VOL BP: 47.56 ML (ref 18–58)
ECHO LA VOL/BSA BIPLANE: 22.54 ML/M2 (ref 16–28)
ECHO LA VOLUME INDEX A2C: 20.74 ML/M2 (ref 16–28)
ECHO LA VOLUME INDEX A4C: 19.68 ML/M2 (ref 16–28)
ECHO LV E' LATERAL VELOCITY: 10.23 CM/S
ECHO LV E' SEPTAL VELOCITY: 9.16 CM/S
ECHO LV INTERNAL DIMENSION DIASTOLIC: 5.03 CM (ref 4.2–5.9)
ECHO LV INTERNAL DIMENSION SYSTOLIC: 3.94 CM
ECHO LV IVSD: 1.01 CM (ref 0.6–1)
ECHO LV MASS 2D: 169.2 G (ref 88–224)
ECHO LV MASS INDEX 2D: 80.2 G/M2 (ref 49–115)
ECHO LV POSTERIOR WALL DIASTOLIC: 0.87 CM (ref 0.6–1)
ECHO LVOT DIAM: 2.15 CM
ECHO LVOT PEAK GRADIENT: 4.94 MMHG
ECHO LVOT PEAK VELOCITY: 111.14 CM/S
ECHO MV A VELOCITY: 40.14 CM/S
ECHO MV AREA PHT: 3.49 CM2
ECHO MV E DECELERATION TIME (DT): 217.64 MS
ECHO MV E VELOCITY: 76.09 CM/S
ECHO MV E/A RATIO: 1.9
ECHO MV E/E' LATERAL: 7.44
ECHO MV E/E' RATIO (AVERAGED): 7.87
ECHO MV E/E' SEPTAL: 8.31
ECHO MV PRESSURE HALF TIME (PHT): 63.11 MS
ECHO PV MAX VELOCITY: 89.82 CM/S
ECHO PV PEAK INSTANTANEOUS GRADIENT SYSTOLIC: 3.23 MMHG
ECHO RV INTERNAL DIMENSION: 4.42 CM
ECHO RV TAPSE: 3.05 CM (ref 1.5–2)
ECHO TV REGURGITANT MAX VELOCITY: 230.09 CM/S
ECHO TV REGURGITANT PEAK GRADIENT: 21.18 MMHG
ERYTHROCYTE [DISTWIDTH] IN BLOOD BY AUTOMATED COUNT: 12.9 % (ref 11.5–14.5)
ERYTHROCYTE [SEDIMENTATION RATE] IN BLOOD: 120 MM/HR (ref 0–15)
EST. AVERAGE GLUCOSE BLD GHB EST-MCNC: 103 MG/DL
FACT VIII ACT/NOR PPP: 135 % (ref 80–200)
GLUCOSE SERPL-MCNC: 85 MG/DL (ref 65–100)
HBA1C MFR BLD: 5.2 % (ref 4–5.6)
HCT VFR BLD AUTO: 38 % (ref 36.6–50.3)
HDLC SERPL-MCNC: 35 MG/DL
HDLC SERPL: 3.7 {RATIO} (ref 0–5)
HGB BLD-MCNC: 12.5 G/DL (ref 12.1–17)
LDLC SERPL CALC-MCNC: 74.2 MG/DL (ref 0–100)
MAGNESIUM SERPL-MCNC: 1.8 MG/DL (ref 1.6–2.4)
MCH RBC QN AUTO: 28.3 PG (ref 26–34)
MCHC RBC AUTO-ENTMCNC: 32.9 G/DL (ref 30–36.5)
MCV RBC AUTO: 86 FL (ref 80–99)
METHADONE UR QL: NEGATIVE
NRBC # BLD: 0 K/UL (ref 0–0.01)
NRBC BLD-RTO: 0 PER 100 WBC
OPIATES UR QL: NEGATIVE
PCP UR QL: NEGATIVE
PHOSPHATE SERPL-MCNC: 3.2 MG/DL (ref 2.6–4.7)
PLATELET # BLD AUTO: 228 K/UL (ref 150–400)
PMV BLD AUTO: 10.7 FL (ref 8.9–12.9)
POTASSIUM SERPL-SCNC: 3.6 MMOL/L (ref 3.5–5.1)
RBC # BLD AUTO: 4.42 M/UL (ref 4.1–5.7)
SAMPLES BEING HELD,HOLD: NORMAL
SODIUM SERPL-SCNC: 144 MMOL/L (ref 136–145)
T4 FREE SERPL-MCNC: 1.1 NG/DL (ref 0.8–1.5)
TRIGL SERPL-MCNC: 109 MG/DL (ref ?–150)
TSH SERPL DL<=0.05 MIU/L-ACNC: 6.6 UIU/ML (ref 0.36–3.74)
VLDLC SERPL CALC-MCNC: 21.8 MG/DL
WBC # BLD AUTO: 7.2 K/UL (ref 4.1–11.1)

## 2021-10-01 PROCEDURE — 74011250637 HC RX REV CODE- 250/637: Performed by: FAMILY MEDICINE

## 2021-10-01 PROCEDURE — 84100 ASSAY OF PHOSPHORUS: CPT

## 2021-10-01 PROCEDURE — 99222 1ST HOSP IP/OBS MODERATE 55: CPT | Performed by: INTERNAL MEDICINE

## 2021-10-01 PROCEDURE — 80048 BASIC METABOLIC PNL TOTAL CA: CPT

## 2021-10-01 PROCEDURE — 85652 RBC SED RATE AUTOMATED: CPT

## 2021-10-01 PROCEDURE — 85306 CLOT INHIBIT PROT S FREE: CPT

## 2021-10-01 PROCEDURE — 85613 RUSSELL VIPER VENOM DILUTED: CPT

## 2021-10-01 PROCEDURE — 85240 CLOT FACTOR VIII AHG 1 STAGE: CPT

## 2021-10-01 PROCEDURE — 74011250636 HC RX REV CODE- 250/636: Performed by: FAMILY MEDICINE

## 2021-10-01 PROCEDURE — 86147 CARDIOLIPIN ANTIBODY EA IG: CPT

## 2021-10-01 PROCEDURE — 99205 OFFICE O/P NEW HI 60 MIN: CPT | Performed by: PSYCHIATRY & NEUROLOGY

## 2021-10-01 PROCEDURE — 85027 COMPLETE CBC AUTOMATED: CPT

## 2021-10-01 PROCEDURE — 86146 BETA-2 GLYCOPROTEIN ANTIBODY: CPT

## 2021-10-01 PROCEDURE — 81240 F2 GENE: CPT

## 2021-10-01 PROCEDURE — 99218 HC RM OBSERVATION: CPT

## 2021-10-01 PROCEDURE — 84443 ASSAY THYROID STIM HORMONE: CPT

## 2021-10-01 PROCEDURE — 86141 C-REACTIVE PROTEIN HS: CPT

## 2021-10-01 PROCEDURE — 96374 THER/PROPH/DIAG INJ IV PUSH: CPT

## 2021-10-01 PROCEDURE — 85303 CLOT INHIBIT PROT C ACTIVITY: CPT

## 2021-10-01 PROCEDURE — 93306 TTE W/DOPPLER COMPLETE: CPT | Performed by: INTERNAL MEDICINE

## 2021-10-01 PROCEDURE — 80061 LIPID PANEL: CPT

## 2021-10-01 PROCEDURE — 84439 ASSAY OF FREE THYROXINE: CPT

## 2021-10-01 PROCEDURE — 36415 COLL VENOUS BLD VENIPUNCTURE: CPT

## 2021-10-01 PROCEDURE — 83036 HEMOGLOBIN GLYCOSYLATED A1C: CPT

## 2021-10-01 PROCEDURE — 85300 ANTITHROMBIN III ACTIVITY: CPT

## 2021-10-01 PROCEDURE — 81241 F5 GENE: CPT

## 2021-10-01 PROCEDURE — 83735 ASSAY OF MAGNESIUM: CPT

## 2021-10-01 PROCEDURE — 85305 CLOT INHIBIT PROT S TOTAL: CPT

## 2021-10-01 PROCEDURE — 80307 DRUG TEST PRSMV CHEM ANLYZR: CPT

## 2021-10-01 PROCEDURE — 93270 REMOTE 30 DAY ECG REV/REPORT: CPT

## 2021-10-01 PROCEDURE — 93272 ECG/REVIEW INTERPRET ONLY: CPT | Performed by: INTERNAL MEDICINE

## 2021-10-01 PROCEDURE — 85302 CLOT INHIBIT PROT C ANTIGEN: CPT

## 2021-10-01 RX ORDER — BISMUTH SUBSALICYLATE 262 MG
1 TABLET,CHEWABLE ORAL DAILY
COMMUNITY

## 2021-10-01 RX ORDER — MAGNESIUM CITRATE
296 SOLUTION, ORAL ORAL
COMMUNITY

## 2021-10-01 RX ADMIN — FAMOTIDINE 20 MG: 20 TABLET ORAL at 08:54

## 2021-10-01 RX ADMIN — SODIUM CHLORIDE 75 ML/HR: 900 INJECTION, SOLUTION INTRAVENOUS at 01:38

## 2021-10-01 RX ADMIN — FAMOTIDINE 20 MG: 20 TABLET ORAL at 21:38

## 2021-10-01 RX ADMIN — ASPIRIN 81 MG CHEWABLE TABLET 81 MG: 81 TABLET CHEWABLE at 08:54

## 2021-10-01 RX ADMIN — ATORVASTATIN CALCIUM 40 MG: 40 TABLET, FILM COATED ORAL at 21:38

## 2021-10-01 NOTE — PROGRESS NOTES
[facility] Adult  Hospitalist Group                                                                                          Hospitalist Progress Note  Maynor De La Vega MD  Answering service: [phone] from in house phone        Date of Service:  10/1/2021  NAME:  Modesta Jacome  :  1990  MRN:  [id number]      Admission Summary:   Modesta Jacome is a 35 y.o. male who presents with left arm numbness     History was primarily obtained from the patient     Patient reports he went to bed at 10 PM last night, was feeling well, reports that he woke up with numbness on the medial side of his left arm going up to his triceps. Patient reports that he felt some weakness in his left arm, got concerned and decided to come to the hospital.  Patient denies any other complaints or problems. Patient was seen in the ER and was requested to be admitted to the hospitalist service. Interval history / Subjective: Follow up left arm numbness. Patient seen and examined at the bedside. Labs, images and notes reviewed  Discussed with nursing staff, orders reviewed. Plan discussed with patient/Family  Left arm numbness much improved. Otherwise feeling well. Denied any weakness. No other concerns or overnight events. Getting echocardiogram done. D/W neurology team.     Assessment & Plan:     #Left arm numbness, due to subacute right parietal CVA  #Hypokalemia  #Marijuana abuse  #Family history of sudden cardiac death at young age along with spinal stroke in sister at 40-year-old  #Elevated TSH at 6.6; s/o hypothyroidism    -Admitted to inpatient, telemetry bed  -Continue aspirin, statin  -Neurology consult. Appreciate recommendations  -MRI brain noted. -Echocardiogram done 10/1/2021. Awaiting results  -Cardiology consult for neurology recommendation for consideration of MARCELO  -Hypercoagulable work-up ordered and collected, awaiting  -Counseled patient for marijuana abstinence  -Repeat TSH tomorrow. Consider levothyroxine if continues remain elevated with repeat TSH/thyroid profile monitoring in 6-8 weeks with PCP  -Monitor lytes along with CBC, CMP    Code status: Full code  DVT prophylaxis: SCDs    Care Plan discussed with: Patient/Family, Nurse and   Anticipated Disposition: Home w/Family  Anticipated Discharge: 24 hours to 48 hours     Hospital Problems  Never Reviewed        Codes Class Noted POA    TIA (transient ischemic attack) ICD-10-CM: G45.9  ICD-9-CM: 435.9  9/30/2021 Unknown                Review of Systems:   A comprehensive review of systems was negative except for that written in the HPI. Vital Signs:    Last 24hrs VS reviewed since prior progress note. Most recent are:  Visit Vitals  /87   Pulse 71   Temp 98.5 °F (36.9 °C)   Resp 16   Ht 5' 11\" (1.803 m)   Wt 91.2 kg (201 lb)   SpO2 99%   BMI 28.03 kg/m²       No intake or output data in the 24 hours ending 10/01/21 [id number]     Physical Examination:     I had a face to face encounter with this patient and independently examined them on 10/1/2021 as outlined below:          Constitutional:  No acute distress, cooperative, pleasant    ENT:  Oral mucosa moist, oropharynx benign. Resp:  CTA bilaterally. No wheezing/rhonchi/rales. No accessory muscle use   CV:  Regular rhythm, normal rate, no murmurs, gallops, rubs    GI:  Soft, non distended, non tender. normoactive bowel sounds, no hepatosplenomegaly     Musculoskeletal:  No edema, warm, 2+ pulses throughout    Neurologic:  Moves all extremities. AAOx3, CN II-XII reviewed. Mild left lateral aspect numbness. No weakness or motor disparity bilaterally in upper or lower extremities.             Data Review:    Review and/or order of clinical lab test  Review and/or order of tests in the radiology section of CPT  Review and/or order of tests in the medicine section of CPT    MRI BRAIN W WO CONT    Result Date: 9/30/2021  Small/punctate cortical subacute infarction of the right parietal lobe. There is no associated hemorrhage, midline shift or mass effect. No intracranial mass, hemorrhage . MRI CERV SPINE W WO CONT    Result Date: 9/30/2021  Essentially normal MRI of the cervical spine. No evidence of cervical cord demyelinating disease. No evidence of canal or foraminal compromise. CT CODE NEURO HEAD WO CONTRAST    Result Date: 9/30/2021  No acute findings. CTA HEAD NECK W CONT    Result Date: 9/30/2021  No evidence of large vessel occlusion or hemodynamically significant carotid stenosis. Labs:     Recent Labs     10/01/21  0312 09/30/21  0408   WBC 7.2 8.9   HGB 12.5 14.7   HCT 38.0 45.3    276     Recent Labs     10/01/21  0312 09/30/21  0408    140   K 3.6 3.3*   * 107   CO2 26 28   BUN 10 14   CREA 0.83 1.19   GLU 85 110*   CA 7.6* 9.4   MG 1.8 2.1   PHOS 3.2  --      Recent Labs     09/30/21  0408   ALT 36   AP 69   TBILI 0.4   TP 8.0   ALB 4.3   GLOB 3.7     No results for input(s): INR, PTP, APTT, INREXT in the last 72 hours. No results for input(s): FE, TIBC, PSAT, FERR in the last 72 hours. No results found for: FOL, RBCF   No results for input(s): PH, PCO2, PO2 in the last 72 hours.   Recent Labs     09/30/21  1836 09/30/21  1129 09/30/21  0408   CPK 91 150 131   TROIQ <0.05 <0.05  --      Lab Results   Component Value Date/Time    Cholesterol, total 131 10/01/2021 03:12 AM    HDL Cholesterol 35 10/01/2021 03:12 AM    LDL, calculated 74.2 10/01/2021 03:12 AM    Triglyceride 109 10/01/2021 03:12 AM    CHOL/HDL Ratio 3.7 10/01/2021 03:12 AM     Lab Results   Component Value Date/Time    Glucose (POC) 109 09/30/2021 04:27 AM     No results found for: COLOR, APPRN, SPGRU, REFSG, MILY, PROTU, GLUCU, KETU, BILU, UROU, SHUBHAM, LEUKU, GLUKE, EPSU, BACTU, WBCU, RBCU, CASTS, UCRY      Medications Reviewed:     Current Facility-Administered Medications   Medication Dose Route Frequency    acetaminophen (TYLENOL) tablet 650 mg  650 mg Oral Q4H PRN Or    acetaminophen (TYLENOL) solution 650 mg  650 mg Per NG tube Q4H PRN    Or    acetaminophen (TYLENOL) suppository 650 mg  650 mg Rectal Q4H PRN    aspirin chewable tablet 81 mg  81 mg Oral DAILY    atorvastatin (LIPITOR) tablet 40 mg  40 mg Oral QHS    famotidine (PEPCID) tablet 20 mg  20 mg Oral Q12H     ______________________________________________________________________  EXPECTED LENGTH OF STAY: - - -  ACTUAL LENGTH OF STAY:          0                 Leila Haas MD

## 2021-10-01 NOTE — PROGRESS NOTES
TRANSFER - IN REPORT:    Verbal report received from [facility]) on Wilberto Retort  being received from ED(unit) for routine progression of care      Report consisted of patients Situation, Background, Assessment and   Recommendations(SBAR). Information from the following report(s) SBAR, Kardex, ED Summary and Recent Results was reviewed with the receiving nurse. Opportunity for questions and clarification was provided. Assessment completed upon patients arrival to unit and care assumed.

## 2021-10-01 NOTE — CONSULTS
Neuro consult completed. Dictated note to follow. Pt with acute stroke, no known risk factors other than smoking marijuana 2-3 times a day. Has family h/o father with sudden cardiac death at 52yo, sister with \"spinal stroke\" at 27yo. Exam with dec PP in left arm only. TTE still pending . Hypercoag panel ordered. If TTE is negative, then MARCELO, may need more prolonged outpt cardiac monitoring. TSH is elevated 6.6 - will defer workup/tx to hospitalist.  Cardiology consult.

## 2021-10-01 NOTE — CONSULTS
[facility]      Cardiology Care Note                                    Initial visit      Patient Name: Kameron TOWNSEND: - OAJ:[id number]  Primary Cardiologist: none  Consulting Cardiologist: Jeanna Haq MD  Reason for Consult: acute CVA,RF: marijuana, family hx of sudden cardiac death in dad at 48, sister with spinal stroke, TTE pending, may need MARCELO, may need more prolonged cardiac monitoring. Subjective:   Currently reports that numbness in left lateral arm from elbow to fingers is improving. Denies any focal weakness. Denies chest pain, SOB, dizziness, palpitations. Assessment and Plan     1. CVA:    -MRI brain: Small/punctate cortical subacute infarction of the  right parietal lobe   -Unclear etiology   -No evidence of afib/flutter on tele review. -TTE pending.    -Hypercoagulable workup pending.   -30 day event monitor to be applied prior to discharge   -On ASA and atorvastatin per neurology. 2. Family history of Sudden death: -(Father  suddenly in his 52's, unclear etiology)   -Will review echocardiogram     3. History of anxiety   -per pt report   -he has follow up with PCP  4. Elevated TSH   -TSH 6.6   -Add on free T4 = 1.1        35 y.o. male with PMH of anxiety who presented with new onset left arm numbness and found to have subacute CVA per MRI read. Numbness improving. No clear etiology of CVA. Has FH of unexplained sudden death in father at young age and sister with history of spinal stroke. Hypercoagulable workup underway. Will place 30 day event monitor today for extended cardiac monitoring. Will review TTE once completed. Saw and evaluated pt and agree with above assessment and plan. No obvious cardiac contribution to current presentation. Compensated on exam. Will set up loop for extended monitoring. Echo was normal and bubble study negative.  If MARCELO is needed, can be set up outpt. No availability for MARCELO today. No additional inpt cardiac evaluation indicated at this time and will sign off. Amita De La Paz MD       ____________________________________________________________      HPI:  35 y.o. male with PMH of anxiety and Left scaphoid fracture/repair. He presents with chief c/o left arm numbness. Reports he woke up at 2:30 AM this morning and noticed left arm numbness, he tried positional changes without relief. He then developed a panic attack and became worried as left arm numbness was not resolving after at least 25 minutes and came to ER. Code stroke was initiated and he was found to have small punctate cortical subacute infarction right parietal lobe. He denies any history of exertional chest pain, SOB. Has had few episodes of dizziness lately when he bends over and stands back up. Reports he takes in adequate hydration. No PMH of CVA, HTN, HLD, syncope. Had history of palpitations in high school, saw a cardiologist in [place] and wore a 24 hour holter monitor  Without any significant findings. He has had no further history of palpitations. His troponins are negative and reports no exertional symptoms. He does report some cold intolerance at times. Received covid vaccines x 2 (pfizer) several months ago. SH: never tobacco smoker, uses THC 2-3 times per day, drinks 1-2 ETOH 2-3 times per week. Works in IT  FH: Father  suddenly in his early 52's, no etiology found (did not have autopsy) but reports his father was fit, worked out, occasional smoker. Dad was having palpitations in days prior to his  Sudden death. Mother has hx of HTN, HLD. Sister has history of spinal stroke          Patient Active Problem List   Diagnosis Code    TIA (transient ischemic attack) G45.9     No specialty comments available.       Review of Systems:    [] Patient unable to provide secondary to condition    CONSTITUTIONAL: negative     ENT/MOUTH: negative     EYES: negative    CARDIOVASCULAR: negative     RESPIRATORY: negative      GASTROINTESTINAL: negative     GENITOURINARY: negative     MUSCULOSKELETAL: negative      SKIN: negative    NEUROLOGICAL: numbness left arm, improving. PSYCHOLOGICAL: had anxiety PTA      HEME/LYMPH: negative    ENDOCRINE: temperature Intolerance          No past medical history on file. No past surgical history on file. Current Facility-Administered Medications   Medication Dose Route Frequency    acetaminophen (TYLENOL) tablet 650 mg  650 mg Oral Q4H PRN    Or    acetaminophen (TYLENOL) solution 650 mg  650 mg Per NG tube Q4H PRN    Or    acetaminophen (TYLENOL) suppository 650 mg  650 mg Rectal Q4H PRN    aspirin chewable tablet 81 mg  81 mg Oral DAILY    atorvastatin (LIPITOR) tablet 40 mg  40 mg Oral QHS    famotidine (PEPCID) tablet 20 mg  20 mg Oral Q12H     No current outpatient medications on file. No Known Allergies     FmHx: family history is not on file. Social Hx :         Objective:    Physical Exam    Vitals:   Vitals:    10/01/21 0200 10/01/21 0300 10/01/21 0552 10/01/21 0600   BP: 115/75 120/77 128/79    Pulse: 60 (!) 56 71 71   Resp:  10 8 17   Temp:       SpO2: 98% 99% 99% 98%   Weight:       Height:           General:    Alert, cooperative, no distress, appears stated age. Neck:   Supple,    Back:     Symmetric,     Lungs:     clear to auscultation bilaterally. Heart[de-identified]    Regular rate and rhythm, S1, S2 normal, no murmur, click, rub or gallop. Abdomen:     Soft, non-tender. Bowel sounds normal.    Extremities:   Extremities normal, atraumatic, no cyanosis or edema.    Vascular:   Pulses - 2+   Skin:   Skin color normal. No rashes or lesions on visible areas   Neurologic:   CN II-XII grossly intact except for decreased sensation to left lateral arm from elbow to 4th/5th digit        Telemetry: NSR, sinus arrhythmia    ECG:   EKG Results     Procedure 720 Value Units Date/Time    EKG, 12 LEAD, INITIAL [[id number]]     Order Status: Sent           Data Review:     Radiology:   XR Results (most recent):  No results found for this or any previous visit.         Recent Labs     09/30/21  1836 09/30/21  1129 09/30/21  0408   CPK 91 150 131   TROIQ <0.05 <0.05  --      Recent Labs     10/01/21  0312 09/30/21  0408    140   K 3.6 3.3*   * 107   CO2 26 28   BUN 10 14   CREA 0.83 1.19   GLU 85 110*   PHOS 3.2  --    CA 7.6* 9.4     Recent Labs     10/01/21  0312 09/30/21  0408   WBC 7.2 8.9   HGB 12.5 14.7   HCT 38.0 45.3    276     Recent Labs     09/30/21  0408   AP 69     Recent Labs     10/01/21  0312   CHOL 131   LDLC 74.2     Recent Labs     10/01/21  0312 09/30/21  0408   CRP  --  <0.29   TSH 6.60*  --        LOGAN Bond-BC  Reta Cole MD      [facility] [address], [address] [address]  [address]  [phone]      CC:None

## 2021-10-01 NOTE — ROUTINE PROCESS
TRANSFER - OUT REPORT:    Verbal report given to RN (name) on Gianni Elder  being transferred to [facility]  (unit) for routine progression of care       Report consisted of patients Situation, Background, Assessment and   Recommendations(SBAR). Information from the following report(s) SBAR, ED Summary, [facility] and Recent Results was reviewed with the receiving nurse. Lines:   Peripheral IV 09/30/21 Anterior;Left;Proximal Forearm (Active)        Opportunity for questions and clarification was provided.       Patient transported with:

## 2021-10-02 VITALS
HEART RATE: 63 BPM | BODY MASS INDEX: 28.09 KG/M2 | DIASTOLIC BLOOD PRESSURE: 79 MMHG | OXYGEN SATURATION: 98 % | WEIGHT: 200.62 LBS | SYSTOLIC BLOOD PRESSURE: 132 MMHG | RESPIRATION RATE: 16 BRPM | TEMPERATURE: 98 F | HEIGHT: 71 IN

## 2021-10-02 LAB
ANION GAP SERPL CALC-SCNC: 4 MMOL/L (ref 5–15)
AT III PPP CHRO-ACNC: 131 % (ref 75–135)
B2 GLYCOPROT1 IGA SER-ACNC: <9 GPI IGA UNITS (ref 0–25)
BASOPHILS # BLD: 0 K/UL (ref 0–0.1)
BASOPHILS NFR BLD: 0 % (ref 0–1)
BUN SERPL-MCNC: 12 MG/DL (ref 6–20)
BUN/CREAT SERPL: 12 (ref 12–20)
CALCIUM SERPL-MCNC: 9 MG/DL (ref 8.5–10.1)
CARDIOLIPIN IGA SER IA-ACNC: <9 APL U/ML (ref 0–11)
CARDIOLIPIN IGG SER IA-ACNC: <9 GPL U/ML (ref 0–14)
CARDIOLIPIN IGM SER IA-ACNC: 9 MPL U/ML (ref 0–12)
CHLORIDE SERPL-SCNC: 107 MMOL/L (ref 97–108)
CO2 SERPL-SCNC: 27 MMOL/L (ref 21–32)
CREAT SERPL-MCNC: 0.97 MG/DL (ref 0.7–1.3)
DIFFERENTIAL METHOD BLD: NORMAL
EOSINOPHIL # BLD: 0.3 K/UL (ref 0–0.4)
EOSINOPHIL NFR BLD: 3 % (ref 0–7)
ERYTHROCYTE [DISTWIDTH] IN BLOOD BY AUTOMATED COUNT: 12.6 % (ref 11.5–14.5)
GLUCOSE SERPL-MCNC: 91 MG/DL (ref 65–100)
HCT VFR BLD AUTO: 44.1 % (ref 36.6–50.3)
HGB BLD-MCNC: 14.3 G/DL (ref 12.1–17)
IMM GRANULOCYTES # BLD AUTO: 0 K/UL (ref 0–0.04)
IMM GRANULOCYTES NFR BLD AUTO: 0 % (ref 0–0.5)
INTERPRETATION, 117893: NORMAL
LYMPHOCYTES # BLD: 3.3 K/UL (ref 0.8–3.5)
LYMPHOCYTES NFR BLD: 40 % (ref 12–49)
MCH RBC QN AUTO: 28.3 PG (ref 26–34)
MCHC RBC AUTO-ENTMCNC: 32.4 G/DL (ref 30–36.5)
MCV RBC AUTO: 87.3 FL (ref 80–99)
MONOCYTES # BLD: 0.5 K/UL (ref 0–1)
MONOCYTES NFR BLD: 6 % (ref 5–13)
NEUTS SEG # BLD: 4.2 K/UL (ref 1.8–8)
NEUTS SEG NFR BLD: 51 % (ref 32–75)
NRBC # BLD: 0 K/UL (ref 0–0.01)
NRBC BLD-RTO: 0 PER 100 WBC
PLATELET # BLD AUTO: 242 K/UL (ref 150–400)
PMV BLD AUTO: 11.4 FL (ref 8.9–12.9)
POTASSIUM SERPL-SCNC: 3.9 MMOL/L (ref 3.5–5.1)
PROT C PPP-ACNC: 121 % (ref 73–180)
PROT S ACT/NOR PPP: 114 % (ref 63–140)
PROT S AG ACT/NOR PPP IA: 94 % (ref 60–150)
PROT S FREE AG ACT/NOR PPP IA: 138 % (ref 61–136)
RBC # BLD AUTO: 5.05 M/UL (ref 4.1–5.7)
SCREEN APTT: 29.2 SEC (ref 0–51.9)
SCREEN DRVVT: 36.9 SEC (ref 0–47)
SODIUM SERPL-SCNC: 138 MMOL/L (ref 136–145)
T3FREE SERPL-MCNC: 3.2 PG/ML (ref 2.2–4)
T4 FREE SERPL-MCNC: 1.2 NG/DL (ref 0.8–1.5)
TSH SERPL DL<=0.05 MIU/L-ACNC: 9.27 UIU/ML (ref 0.36–3.74)
WBC # BLD AUTO: 8.3 K/UL (ref 4.1–11.1)

## 2021-10-02 PROCEDURE — 36415 COLL VENOUS BLD VENIPUNCTURE: CPT

## 2021-10-02 PROCEDURE — 84481 FREE ASSAY (FT-3): CPT

## 2021-10-02 PROCEDURE — 85025 COMPLETE CBC W/AUTO DIFF WBC: CPT

## 2021-10-02 PROCEDURE — 84439 ASSAY OF FREE THYROXINE: CPT

## 2021-10-02 PROCEDURE — 90686 IIV4 VACC NO PRSV 0.5 ML IM: CPT | Performed by: INTERNAL MEDICINE

## 2021-10-02 PROCEDURE — 84443 ASSAY THYROID STIM HORMONE: CPT

## 2021-10-02 PROCEDURE — 74011250636 HC RX REV CODE- 250/636: Performed by: INTERNAL MEDICINE

## 2021-10-02 PROCEDURE — 74011250637 HC RX REV CODE- 250/637: Performed by: INTERNAL MEDICINE

## 2021-10-02 PROCEDURE — 99218 HC RM OBSERVATION: CPT

## 2021-10-02 PROCEDURE — 74011250637 HC RX REV CODE- 250/637: Performed by: FAMILY MEDICINE

## 2021-10-02 PROCEDURE — 90471 IMMUNIZATION ADMIN: CPT

## 2021-10-02 PROCEDURE — 80048 BASIC METABOLIC PNL TOTAL CA: CPT

## 2021-10-02 RX ORDER — HYDROXYZINE HYDROCHLORIDE 10 MG/1
10 TABLET, FILM COATED ORAL
Status: DISCONTINUED | OUTPATIENT
Start: 2021-10-02 | End: 2021-10-02 | Stop reason: HOSPADM

## 2021-10-02 RX ORDER — HYDROXYZINE HYDROCHLORIDE 10 MG/1
10 TABLET, FILM COATED ORAL
Qty: 60 TABLET | Refills: 0 | Status: SHIPPED | OUTPATIENT
Start: 2021-10-02 | End: 2021-10-12

## 2021-10-02 RX ORDER — LEVOTHYROXINE SODIUM 25 UG/1
25 TABLET ORAL
Qty: 30 TABLET | Refills: 0 | Status: SHIPPED | OUTPATIENT
Start: 2021-10-03 | End: 2021-11-03 | Stop reason: SDUPTHER

## 2021-10-02 RX ORDER — ATORVASTATIN CALCIUM 40 MG/1
40 TABLET, FILM COATED ORAL
Qty: 30 TABLET | Refills: 0 | Status: SHIPPED | OUTPATIENT
Start: 2021-10-02 | End: 2021-11-03 | Stop reason: SDUPTHER

## 2021-10-02 RX ORDER — LEVOTHYROXINE SODIUM 25 UG/1
25 TABLET ORAL
Status: DISCONTINUED | OUTPATIENT
Start: 2021-10-02 | End: 2021-10-02 | Stop reason: HOSPADM

## 2021-10-02 RX ORDER — GUAIFENESIN 100 MG/5ML
81 LIQUID (ML) ORAL DAILY
Qty: 30 TABLET | Refills: 0 | Status: SHIPPED | OUTPATIENT
Start: 2021-10-03

## 2021-10-02 RX ADMIN — FAMOTIDINE 20 MG: 20 TABLET ORAL at 08:20

## 2021-10-02 RX ADMIN — INFLUENZA VIRUS VACCINE 0.5 ML: 15; 15; 15; 15 SUSPENSION INTRAMUSCULAR at 14:01

## 2021-10-02 RX ADMIN — ASPIRIN 81 MG CHEWABLE TABLET 81 MG: 81 TABLET CHEWABLE at 08:19

## 2021-10-02 RX ADMIN — LEVOTHYROXINE SODIUM 25 MCG: 0.03 TABLET ORAL at 10:54

## 2021-10-02 RX ADMIN — HYDROXYZINE HYDROCHLORIDE 10 MG: 10 TABLET ORAL at 10:54

## 2021-10-02 NOTE — DISCHARGE INSTRUCTIONS
Discharge Instructions       PATIENT ID: Rosalino Costello  MRN: [id number]   YOB: 1990    DATE OF ADMISSION: 9/30/2021  4:07 AM    DATE OF DISCHARGE: 10/2/2021    PRIMARY CARE PROVIDER: None     ATTENDING PHYSICIAN: Stephanie Russell MD  DISCHARGING PROVIDER: Stacey Washington MD    To contact this individual call [phone] and ask the  to page. If unavailable ask to be transferred the Adult Hospitalist Department. DISCHARGE DIAGNOSES   #Left arm numbness, due to subacute right parietal CVA  #Hypokalemia  #Marijuana abuse  #Family history of sudden cardiac death at young age along with spinal stroke in sister at 80-year-old  #Subclinical hypothyroidism. Elevated TSH at 6.6 on admission, repeat check remains elevated >9. Free T4 and T3 WNL.    -Admitted to inpatient, telemetry bed  -Continue aspirin 81 mg, Lipitor 40 mg  -Neurology consult. Appreciate recommendations. Outpatient follow-up in 4 to 6 weeks  -MRI brain noted. -Echocardiogram done 10/1/2021. Noted. Grossly unremarkable.  -Cardiology consult for neurology recommendation for consideration of MARCELO. Appreciate recommendations. 30-day cardiac event monitor given per cardiology.  -Outpatient cardiology follow-up to reassess any need for MARCELO  -Hypercoagulable work-up ordered and collected, awaiting. Defer further follow-up with neurology outpatient.  -Counseled patient for marijuana abstinence  -Levothyroxine 25 mcg daily. Repeat thyroid profile in 6 to 8 weeks with PCP.  -Monitor lytes along with CBC, CMP     Code status: Full code  DVT prophylaxis: SCDs     Care Plan discussed with: Patient/Family, Nurse and   Anticipated Disposition: Home w/Family  Anticipated Discharge: DC home today.     CONSULTATIONS: IP CONSULT TO HOSPITALIST  IP CONSULT TO NEUROLOGY  IP CONSULT TO CARDIOLOGY    PROCEDURES/SURGERIES: * No surgery found *    PENDING TEST RESULTS:   At the time of discharge the following test results are still pending: Hypercoagulable work-up and 30-day event monitor assessment on follow-up    FOLLOW UP APPOINTMENTS:   Follow-up Information     Follow up With Specialties Details Why Contact [place] [id number] [facility] [address]  Schedule an appointment as soon as possible for a visit Neurology: Patient seen by Dr. Christiane Rothman. Please schedule follow-up appointment in 4 to 6 weeks or at the earliest possible or as needed. [address] EMERGENCY DEP Emergency Medicine  If symptoms worsen Dany angel 17 [address] [facility]    Mikayla Garcia MD Cardiology In 4 weeks Cardiology: Please follow-up after 30-day event monitor completion. Please call office to schedule follow-up appointment in 4 weeks. [address]             ADDITIONAL CARE RECOMMENDATIONS:   Follow up as noted in the appointments. PCP f/up with CBC, CMP/BMP check in 1 week. Please call office of other specialists involved in your care for scheduling/ verifying appointments and for any Q/concerns. · It is important that you take the medication exactly as they are prescribed. · Keep your medication in the bottles provided by the pharmacist and keep a list of the medication names, dosages, and times to be taken in your wallet. · Do not take other medications without consulting your doctor. · No drinking alcohol or driving car or operating machinery if you are on narcotic pain medications. Donot take sedating mediations if you are sleepy or confused. · Fall Precautions  · Keep Well Hydrated  · Report to your medical provider if you feel you have  developed allergies to medications  · Follow up with your PCP or Consultant for medication adjustments and refills  · Monitor for signs of fevers,chills,bleeding,chest pain and seek medical attention if you do so.         DIET: Cardiac Diet    ACTIVITY: Activity as tolerated    WOUND CARE: N/A    EQUIPMENT needed: 30-day cardiac event monitor-arranged by cardiology team      Radiology:  MRI BRAIN W WO CONT    Result Date: 9/30/2021  Small/punctate cortical subacute infarction of the  right parietal lobe. There is no associated hemorrhage, midline shift or mass effect. No intracranial mass, hemorrhage . MRI CERV SPINE W WO CONT    Result Date: 9/30/2021  Essentially normal MRI of the cervical spine. No evidence of cervical cord demyelinating disease. No evidence of canal or foraminal compromise. CT CODE NEURO HEAD WO CONTRAST    Result Date: 9/30/2021  No acute findings. CTA HEAD NECK W CONT    Result Date: 9/30/2021  No evidence of large vessel occlusion or hemodynamically significant carotid stenosis.     Laboratory data:  Recent Results (from the past 24 hour(s))   ECHO ADULT COMPLETE    Collection Time: 10/01/21  1:50 PM   Result Value Ref Range    IVSd 1.01 (A) 0.60 - 1.00 cm    LVIDd 5.03 4.20 - 5.90 cm    LVIDs 3.94 cm    LVOT d 2.15 cm    LVPWd 0.87 0.60 - 1.00 cm    LVOT Peak Gradient 4.94 mmHg    LVOT Peak Velocity 111.14 cm/s    RVIDd 4.42 cm    Left Atrium Major Axis 3.43 cm    LA Volume 47.56 18.0 - 58.0 mL    LA Area 4C 17.07 cm2    LA Vol 2C 43.76 18.00 - 58.00 mL    LA Vol 4C 41.52 18.00 - 58.00 mL    Aortic Valve Area by Continuity of Peak Velocity 2.83 cm2    AoV PG 8.11 mmHg    Aortic Valve Systolic Peak Velocity 242.31 cm/s    MV A Scott 40.14 cm/s    Mitral Valve E Wave Deceleration Time 217.64 ms    MV E Scott 76.09 cm/s    E/E' lateral 7.44     E/E' septal 8.31     LV E' Lateral Velocity 10.23 cm/s    LV E' Septal Velocity 9.16 cm/s    Mitral Valve Pressure Half-time 63.11 ms    MVA (PHT) 3.49 cm2    Pulmonic Valve Systolic Peak Instantaneous Gradient 3.23 mmHg    Pulmonic Valve Max Velocity 89.82 cm/s    Tapse 3.05 (A) 1.50 - 2.00 cm    Triscuspid Valve Regurgitation Peak Gradient 21.18 mmHg    TR Max Velocity 230.09 cm/s    Ao Root D 2.66 cm    MV E/A 1.90     LV Mass .2 88.0 - 224.0 g    LV Mass AL Index 80.2 49.0 - 115.0 g/m2    E/E' ratio (averaged) 7.87     Left Atrium Minor Axis 1.63 cm    LA Vol Index 22.54 16.00 - 28.00 ml/m2    LA Vol Index 20.74 16.00 - 28.00 ml/m2    LA Vol Index 19.68 16.00 - 28.00 ml/m2    JOESPH/BSA Pk Scott 1.3 UL5/X6   METABOLIC PANEL, BASIC    Collection Time: 10/02/21  2:15 AM   Result Value Ref Range    Sodium 138 136 - 145 mmol/L    Potassium 3.9 3.5 - 5.1 mmol/L    Chloride 107 97 - 108 mmol/L    CO2 27 21 - 32 mmol/L    Anion gap 4 (L) 5 - 15 mmol/L    Glucose 91 65 - 100 mg/dL    BUN 12 6 - 20 MG/DL    Creatinine 0.97 0.70 - 1.30 MG/DL    BUN/Creatinine ratio 12 12 - 20      GFR est AA >60 >60 ml/min/1.73m2    GFR est non-AA >60 >60 ml/min/1.73m2    Calcium 9.0 8.5 - 10.1 MG/DL   CBC WITH AUTOMATED DIFF    Collection Time: 10/02/21  2:15 AM   Result Value Ref Range    WBC 8.3 4.1 - 11.1 K/uL    RBC 5.05 4. 10 - 5.70 M/uL    HGB 14.3 12.1 - 17.0 g/dL    HCT 44.1 36.6 - 50.3 %    MCV 87.3 80.0 - 99.0 FL    MCH 28.3 26.0 - 34.0 PG    MCHC 32.4 30.0 - 36.5 g/dL    RDW 12.6 11.5 - 14.5 %    PLATELET 810 314 - 759 K/uL    MPV 11.4 8.9 - 12.9 FL    NRBC 0.0 0  WBC    ABSOLUTE NRBC 0.00 0.00 - 0.01 K/uL    NEUTROPHILS 51 32 - 75 %    LYMPHOCYTES 40 12 - 49 %    MONOCYTES 6 5 - 13 %    EOSINOPHILS 3 0 - 7 %    BASOPHILS 0 0 - 1 %    IMMATURE GRANULOCYTES 0 0.0 - 0.5 %    ABS. NEUTROPHILS 4.2 1.8 - 8.0 K/UL    ABS. LYMPHOCYTES 3.3 0.8 - 3.5 K/UL    ABS. MONOCYTES 0.5 0.0 - 1.0 K/UL    ABS. EOSINOPHILS 0.3 0.0 - 0.4 K/UL    ABS. BASOPHILS 0.0 0.0 - 0.1 K/UL    ABS. IMM.  GRANS. 0.0 0.00 - 0.04 K/UL    DF AUTOMATED     TSH 3RD GENERATION    Collection Time: 10/02/21  2:15 AM   Result Value Ref Range    TSH 9.27 (H) 0.36 - 3.74 uIU/mL   T4, FREE    Collection Time: 10/02/21  2:15 AM   Result Value Ref Range    T4, Free 1.2 0.8 - 1.5 NG/DL   T3, FREE    Collection Time: 10/02/21  2:15 AM   Result Value Ref Range    Free Triiodothyronine (T3) 3.2 2.2 - 4.0 pg/mL       Echocardiogram 10/1/2021:  Final result   · Bubble study: Saline contrast was given to evaluate for intracardiac shunt. No shunt seen. · LV: Estimated LVEF is 55 - 60%. Normal cavity size, wall thickness and systolic function (ejection fraction normal). Wall motion: normal. Age-appropriate left ventricular diastolic function. DISCHARGE MEDICATIONS:   See Medication Reconciliation Form    · It is important that you take the medication exactly as they are prescribed. · Keep your medication in the bottles provided by the pharmacist and keep a list of the medication names, dosages, and times to be taken in your wallet. · Do not take other medications without consulting your doctor. NOTIFY YOUR PHYSICIAN FOR ANY OF THE FOLLOWING:   Fever over 101 degrees for 24 hours. Chest pain, shortness of breath, fever, chills, nausea, vomiting, diarrhea, change in mentation, falling, weakness, bleeding. Severe pain or pain not relieved by medications. Or, any other signs or symptoms that you may have questions about. DISPOSITION:  x  Home With:   OT  PT  HH  RN       SNF/Inpatient Rehab/LTAC    Independent/assisted living    Hospice    Other:     CDMP Checked:   Yes x     PROBLEM LIST Updated:  Yes x        My Medications      START taking these medications      Instructions Each Dose to Equal Morning Noon Evening Bedtime   aspirin 81 mg chewable tablet  Start taking on: October 3, 2021    Your last dose was: Your next dose is: Take 1 Tablet by mouth daily. 81 mg                 atorvastatin 40 mg tablet  Commonly known as: LIPITOR    Your last dose was: Your next dose is: Take 1 Tablet by mouth nightly. 40 mg                 hydrOXYzine HCL 10 mg tablet  Commonly known as: ATARAX    Your last dose was: Your next dose is:          Take 1 Tablet by mouth three (3) times daily as needed for Itching for up to 10 days. 10 mg                 levothyroxine 25 mcg tablet  Commonly known as: SYNTHROID  Start taking on: October 3, 2021    Your last dose was: Your next dose is: Take 1 Tablet by mouth Daily (before breakfast). 25 mcg                    CONTINUE taking these medications      Instructions Each Dose to Equal Morning Noon Evening Bedtime   magnesium citrate solution    Your last dose was: Your next dose is: Take 296 mL by mouth daily as needed. 296 mL                 multivitamin tablet  Commonly known as: ONE A DAY    Your last dose was: Your next dose is: Take 1 Tablet by mouth daily. 1 Tablet                 TURMERIC PO    Your last dose was: Your next dose is: Take  by mouth daily. Unknown dose                     STOP taking these medications    GINKGO BILOBA EXTRACT PO              Where to Get Your Medications      Information on where to get these meds will be given to you by the nurse or doctor.     Ask your nurse or doctor about these medications  · aspirin 81 mg chewable tablet  · atorvastatin 40 mg tablet  · hydrOXYzine HCL 10 mg tablet  · levothyroxine 25 mcg tablet         Signed:   Rebecca Menjivar MD  10/2/2021  12:34 PM

## 2021-10-02 NOTE — DISCHARGE SUMMARY
Discharge Summary       PATIENT ID: Richard Valadez  MRN: [id number]   YOB: 1990    DATE OF ADMISSION: 9/30/2021  4:07 AM    DATE OF DISCHARGE: 10/2/2021  PRIMARY CARE PROVIDER: None     ATTENDING PHYSICIAN: Mendez Hood MD  DISCHARGING PROVIDER: Mendez Hood MD    To contact this individual call [phone] and ask the  to page. If unavailable ask to be transferred the Adult Hospitalist Department. CONSULTATIONS: IP CONSULT TO HOSPITALIST  IP CONSULT TO NEUROLOGY  IP CONSULT TO CARDIOLOGY    PROCEDURES/SURGERIES: * No surgery found *    ADMITTING [address] [place] [address] COURSE:   Subacute right parietal CVA  Hypokalemia  Marijuana abuse    Admission Summary:   Ming Fowler a 35 y.o. male who presents with left arm numbness     History was primarily obtained from the patient     Patient reports he went to bed at 10 PM last night, was feeling well, reports that he woke up with numbness on the medial side of his left arm going up to his triceps.  Patient reports that he felt some weakness in his left arm, got concerned and decided to come to the hospital.  Patient denies any other complaints or problems.  Patient was seen in the ER and was requested to be admitted to the hospitalist service.     Interval history / Subjective: Follow up left arm numbness. Patient seen and examined at the bedside. Labs, images and notes reviewed  Discussed with nursing staff, orders reviewed. Plan discussed with patient/Family  Left arm numbness much improved. Otherwise feeling well. Denied any weakness. No other concerns or overnight events. Getting echocardiogram done. D/W neurology team.  Mother at bedside. Counseled for thyroid profile monitoring with PCP in 6-8 weeks. Counseled for THC/CBD abstinence. Counseled for lifestyle modifications at length. Counseled for medications at length.  Answered all his questions and concerns.      DISCHARGE DIAGNOSES / PLAN:      # Left arm numbness, due to subacute right parietal CVA  # Hypokalemia  # Marijuana abuse  # Family history of sudden cardiac death at young age along with spinal stroke in sister at 35-year-old  # Subclinical hypothyroidism. Elevated TSH at 6.6 on admission, repeat check remains elevated >9. Free T4 and T3 WNL.    -Admitted to inpatient, telemetry bed  -Continue aspirin 81 mg, Lipitor 40 mg  -Neurology consult. Appreciate recommendations. Outpatient follow-up in 4 to 6 weeks  -MRI brain noted. -Echocardiogram done 10/1/2021. Noted. Grossly unremarkable.  -Cardiology consult for neurology recommendation for consideration of MARCELO. Appreciate recommendations. 30-day cardiac event monitor given per cardiology.  -Outpatient cardiology follow-up to reassess any need for MARCELO  -Hypercoagulable work-up ordered and collected, awaiting. Defer further follow-up with neurology outpatient.  -Counseled patient for marijuana abstinence  -Levothyroxine 25 mcg daily. Repeat thyroid profile in 6 to 8 weeks with PCP.  -Monitor lytes along with CBC, CMP     Code status: Full code  DVT prophylaxis: SCDs     Care Plan discussed with: Patient/Family, Nurse and   Anticipated Disposition: Home w/Family  Anticipated Discharge: DC home today. ADDITIONAL CARE RECOMMENDATIONS:   Follow up as noted in the appointments. PCP f/up with CBC, CMP/BMP check in 1 week. Please call office of other specialists involved in your care for scheduling/ verifying appointments and for any Q/concerns. · It is important that you take the medication exactly as they are prescribed. · Keep your medication in the bottles provided by the pharmacist and keep a list of the medication names, dosages, and times to be taken in your wallet. · Do not take other medications without consulting your doctor. · No drinking alcohol or driving car or operating machinery if you are on narcotic pain medications. Donot take sedating mediations if you are sleepy or confused. · Fall Precautions  · Keep Well Hydrated  · Report to your medical provider if you feel you have  developed allergies to medications  · Follow up with your PCP or Consultant for medication adjustments and refills  · Monitor for signs of fevers,chills,bleeding,chest pain and seek medical attention if you do so.          DIET: Cardiac Diet     ACTIVITY: Activity as tolerated     WOUND CARE: N/A     EQUIPMENT needed: 30-day cardiac event monitor-arranged by cardiology team     PENDING TEST RESULTS:   At the time of discharge the following test results are still pending:   Hypercoagulable work-up and 30-day cardiac event monitor follow-up    FOLLOW UP APPOINTMENTS:    Follow-up Information     Follow up With Specialties Details Why Contact Info    [facility] Yves Huerta  Schedule an appointment as soon as possible for a visit Neurology: Patient seen by Dr. Lexi La. Please schedule follow-up appointment in 4 to 6 weeks or at the earliest possible or as needed. [address] EMERGENCY DEP Emergency Medicine  If symptoms worsen Dany Plummer [address] [place] [address]    Jason Alas MD Cardiology In 4 weeks Cardiology: Please follow-up after 30-day event monitor completion. Please call office to schedule follow-up appointment in 4 weeks. [address] [place] [address]   [facility]  [phone]               DIET: Cardiac Diet    ACTIVITY: Activity as tolerated    WOUND CARE: N/A    EQUIPMENT needed: N/A      DISCHARGE MEDICATIONS:  Current Discharge Medication List      START taking these medications    Details   aspirin 81 mg chewable tablet Take 1 Tablet by mouth daily. Qty: 30 Tablet, Refills: 0  Start date: 10/3/2021      atorvastatin (LIPITOR) 40 mg tablet Take 1 Tablet by mouth nightly.   Qty: 30 Tablet, Refills: 0  Start date: 10/2/2021      hydrOXYzine HCL (ATARAX) 10 mg tablet Take 1 Tablet by mouth three (3) times daily as needed for Itching for up to 10 days. Qty: 60 Tablet, Refills: 0  Start date: 10/2/2021, End date: 10/12/2021      levothyroxine (SYNTHROID) 25 mcg tablet Take 1 Tablet by mouth Daily (before breakfast). Qty: 30 Tablet, Refills: 0  Start date: 10/3/2021         CONTINUE these medications which have NOT CHANGED    Details   magnesium citrate solution Take 296 mL by mouth daily as needed. TURMERIC PO Take  by mouth daily. Unknown dose      multivitamin (ONE A DAY) tablet Take 1 Tablet by mouth daily. STOP taking these medications       GINKGO BILOBA EXTRACT PO Comments:   Reason for Stopping:                 NOTIFY YOUR PHYSICIAN FOR ANY OF THE FOLLOWING:   Fever over 101 degrees for 24 hours. Chest pain, shortness of breath, fever, chills, nausea, vomiting, diarrhea, change in mentation, falling, weakness, bleeding. Severe pain or pain not relieved by medications. Or, any other signs or symptoms that you may have questions about. DISPOSITION:   x Home With:   OT  PT  HH  RN       Long term SNF/Inpatient Rehab    Independent/assisted living    Hospice    Other:       PATIENT CONDITION AT DISCHARGE:     Functional status    Poor     Deconditioned    x Independent      Cognition   x  Lucid     Forgetful     Dementia      Catheters/lines (plus indication)    Feng     PICC     PEG    x None      Code status   x  Full code     DNR      PHYSICAL EXAMINATION AT DISCHARGE:  Visit Vitals  /85   Pulse 60   Temp 97.7 °F (36.5 °C)   Resp 12   Ht 5' 11\" (1.803 m)   Wt 91 kg (200 lb 9.9 oz)   SpO2 97%   BMI 27.98 kg/m²       I had a face to face encounter with this patient and independently examined them on 10/2/2021 as outlined below:                                                   Constitutional:  No acute distress, cooperative, pleasant    ENT:  Oral mucosa moist, oropharynx benign. Resp:  CTA bilaterally. No wheezing/rhonchi/rales.  No accessory muscle use   CV:  Regular rhythm, normal rate, no murmurs, gallops, rubs. GI:  Soft, non distended, non tender. normoactive bowel sounds, no hepatosplenomegaly     Musculoskeletal:  No edema, warm, 2+ pulses throughout    Neurologic:  Moves all extremities. AAOx3, CN II-XII reviewed. Mild left lateral aspect numbness. No weakness or motor disparity bilaterally in upper or lower extremities. CHRONIC MEDICAL DIAGNOSES:  Problem List as of 10/2/2021 Never Reviewed        Codes Class Noted - Resolved    TIA (transient ischemic attack) ICD-10-CM: G45.9  ICD-9-CM: 435.9  9/30/2021 - Present            Radiology:   MRI BRAIN W WO CONT   Result Date: 9/30/2021   Small/punctate cortical subacute infarction of the right parietal lobe. There is no associated hemorrhage, midline shift or mass effect. No intracranial mass, hemorrhage . MRI CERV SPINE W WO CONT   Result Date: 9/30/2021   Essentially normal MRI of the cervical spine. No evidence of cervical cord demyelinating disease. No evidence of canal or foraminal compromise. CT CODE NEURO HEAD WO CONTRAST   Result Date: 9/30/2021   No acute findings. CTA HEAD NECK W CONT   Result Date: 9/30/2021   No evidence of large vessel occlusion or hemodynamically significant carotid stenosis.      Laboratory data:         Recent Results (from the past 24 hour(s))   ECHO ADULT COMPLETE    Collection Time: 10/01/21 1:50 PM   Result Value Ref Range    IVSd 1.01 (A) 0.60 - 1.00 cm    LVIDd 5.03 4.20 - 5.90 cm    LVIDs 3.94 cm    LVOT d 2.15 cm    LVPWd 0.87 0.60 - 1.00 cm    LVOT Peak Gradient 4.94 mmHg    LVOT Peak Velocity 111.14 cm/s    RVIDd 4.42 cm    Left Atrium Major Axis 3.43 cm    LA Volume 47.56 18.0 - 58.0 mL    LA Area 4C 17.07 cm2    LA Vol 2C 43.76 18.00 - 58.00 mL    LA Vol 4C 41.52 18.00 - 58.00 mL    Aortic Valve Area by Continuity of Peak Velocity 2.83 cm2    AoV PG 8.11 mmHg    Aortic Valve Systolic Peak Velocity 382.48 cm/s    MV A Scott 40.14 cm/s Mitral Valve E Wave Deceleration Time 217.64 ms    MV E Scott 76.09 cm/s    E/E' lateral 7.44     E/E' septal 8.31     LV E' Lateral Velocity 10.23 cm/s    LV E' Septal Velocity 9.16 cm/s    Mitral Valve Pressure Half-time 63.11 ms    MVA (PHT) 3.49 cm2    Pulmonic Valve Systolic Peak Instantaneous Gradient 3.23 mmHg    Pulmonic Valve Max Velocity 89.82 cm/s    Tapse 3.05 (A) 1.50 - 2.00 cm    Triscuspid Valve Regurgitation Peak Gradient 21.18 mmHg    TR Max Velocity 230.09 cm/s    Ao Root D 2.66 cm    MV E/A 1.90     LV Mass .2 88.0 - 224.0 g    LV Mass AL Index 80.2 49.0 - 115.0 g/m2    E/E' ratio (averaged) 7.87     Left Atrium Minor Axis 1.63 cm    LA Vol Index 22.54 16.00 - 28.00 ml/m2    LA Vol Index 20.74 16.00 - 28.00 ml/m2    LA Vol Index 19.68 16.00 - 28.00 ml/m2    JOESPH/BSA Pk Scott 1.3 PS8/V4   METABOLIC PANEL, BASIC    Collection Time: 10/02/21 2:15 AM   Result Value Ref Range    Sodium 138 136 - 145 mmol/L    Potassium 3.9 3.5 - 5.1 mmol/L    Chloride 107 97 - 108 mmol/L    CO2 27 21 - 32 mmol/L    Anion gap 4 (L) 5 - 15 mmol/L    Glucose 91 65 - 100 mg/dL    BUN 12 6 - 20 MG/DL    Creatinine 0.97 0.70 - 1.30 MG/DL    BUN/Creatinine ratio 12 12 - 20     GFR est AA >60 >60 ml/min/1.73m2    GFR est non-AA >60 >60 ml/min/1.73m2    Calcium 9.0 8.5 - 10.1 MG/DL   CBC WITH AUTOMATED DIFF    Collection Time: 10/02/21 2:15 AM   Result Value Ref Range    WBC 8.3 4.1 - 11.1 K/uL    RBC 5.05 4. 10 - 5.70 M/uL    HGB 14.3 12.1 - 17.0 g/dL    HCT 44.1 36.6 - 50.3 %    MCV 87.3 80.0 - 99.0 FL    MCH 28.3 26.0 - 34.0 PG    MCHC 32.4 30.0 - 36.5 g/dL    RDW 12.6 11.5 - 14.5 %    PLATELET 810 404 - 360 K/uL    MPV 11.4 8.9 - 12.9 FL    NRBC 0.0 0  WBC    ABSOLUTE NRBC 0.00 0.00 - 0.01 K/uL    NEUTROPHILS 51 32 - 75 %    LYMPHOCYTES 40 12 - 49 %    MONOCYTES 6 5 - 13 %    EOSINOPHILS 3 0 - 7 %    BASOPHILS 0 0 - 1 %    IMMATURE GRANULOCYTES 0 0.0 - 0.5 %    ABS. NEUTROPHILS 4.2 1.8 - 8.0 K/UL    ABS.  LYMPHOCYTES 3.3 0.8 - 3.5 K/UL    ABS. MONOCYTES 0.5 0.0 - 1.0 K/UL    ABS. EOSINOPHILS 0.3 0.0 - 0.4 K/UL    ABS. BASOPHILS 0.0 0.0 - 0.1 K/UL    ABS. IMM. GRANS. 0.0 0.00 - 0.04 K/UL    DF AUTOMATED    TSH 3RD GENERATION    Collection Time: 10/02/21 2:15 AM   Result Value Ref Range    TSH 9.27 (H) 0.36 - 3.74 uIU/mL   T4, FREE    Collection Time: 10/02/21 2:15 AM   Result Value Ref Range    T4, Free 1.2 0.8 - 1.5 NG/DL   T3, FREE    Collection Time: 10/02/21 2:15 AM   Result Value Ref Range    Free Triiodothyronine (T3) 3.2 2.2 - 4.0 pg/mL     Echocardiogram 10/1/2021:   Final result   Bubble study: Saline contrast was given to evaluate for intracardiac shunt. No shunt seen. LV: Estimated LVEF is 55 - 60%. Normal cavity size, wall thickness and systolic function (ejection fraction normal). Wall motion: normal. Age-appropriate left ventricular diastolic function.        Greater than 40 minutes were spent with the patient on counseling and coordination of care    Signed:   Prem Chan MD  10/2/2021  12:36 PM

## 2021-10-02 NOTE — CONSULTS
[address]    Name:  Mame Bolden  MR#:  [id number]  :  1990  ACCOUNT #:  [de-identified]  DATE OF SERVICE:  10/01/2021    NEUROLOGY CONSULTATION    HISTORY OF PRESENT ILLNESS:  This is a 81-year-old right-handed male who was admitted on 2021 with complaints of left arm numbness on awakening. The patient did not have any known stroke risk factors other than daily marijuana use. CT of the head was unremarkable. CTA of the head and neck negative. MRI of the cervical spine was negative. MRI of the brain revealed a tiny cortical infarct in the right parietal region. His LDL was 74.2, hemoglobin A1c was 5.2. He has been started on aspirin 81 mg a day and Lipitor 40 mg a day. He continues to have left arm numbness. The patient reports a family history of sudden cardiac death in his father at age 48 and his sister with a spinal cord infarct at age 32; he is not certain of the cause. His blood pressure on presentation was 151/90. He feels that his sensation is about 85% better. PAST MEDICAL HISTORY:  None. MEDICATIONS AT HOME:  None. ALLERGIES:  NONE. SOCIAL HISTORY:  He lives in [place] with his girlfriend. He is in IT. He occasionally drinks alcohol maybe once or twice a week if that. No tobacco use. Again, smokes marijuana two to three times a day. FAMILY HISTORY:  Brother is one and a half years older with obesity, no other health issues. Sister is five years older with history of a spinal infarct at age 32. Mom with thyroid cancer and Parkinson's disease. Maternal grandmother and grandfather with strokes at their at older age and his father with sudden cardiac death at 48. REVIEW OF SYSTEMS:  As per past medical history and HPI, otherwise, reviewed and negative. PHYSICAL EXAMINATION:  GENERAL:  He is a well-nourished, well-developed, healthy-appearing male sitting on bed in no distress.   VITAL SIGNS:  Blood pressure 131/79, pulse 81, respiratory rate 20, saturating 98% on room air, temperature 98.4, BMI of 28. HEART:  Regular rate and rhythm without murmurs, gallops, or rubs. CAROTIDS: 2+, no bruits. EXTREMITIES:  Warm. No edema. 2+ radial pulses . NEUROLOGIC:  Mental Status:  Alert and oriented x4. Speech and language intact. Attention, memory, and fund of knowledge appropriate. Cranial Nerve:  No facial asymmetry or ptosis. Extraocular eye movements intact without diplopia or nystagmus. Visual fields are full. Pupils equally round and reactive. Tongue midline. Palate elevates symmetrically. Trapezius and sternocleidomastoid are 5/5. Motor exam is 5/5 throughout. No pronator drift. No tremors. Sensory exam is decreased to pinprick in the left arm only and no particular dermatome distribution. Coordination was intact to finger-to-nose, heel-to-shin, rapid alternating movements. Reflexes are symmetric. Toes downgoing. Gait not assessed at this time. STUDIES AND REPORTS:  Reviewed above. In addition, TSH 6.60, sed rate 4, CRP less than 0.29. CMP and CBC unremarkable. . His echocardiogram is pending hypercoagulability. ASSESSMENT AND PLAN:  This is a 41-year-old right-handed male with no known stroke risk factors but with a family history of sudden cardiac death in his father at 48 and spinal cord infarct in sister at 32. He does smoke marijuana several times a day. Presenting with left arm numbness, found to have a tiny right cortical infarct. Exam was nonfocal except for decreased sensation in the left arm. Agree with starting aspirin 81 mg a day and Lipitor 40 mg a day. Transthoracic echo is pending. I ordered hypercoag panel. If his transthoracic echocardiogram is negative, he may need a transesophageal echocardiogram and may need more prolonged cardiac monitoring.   Given possible need for MARCELO and his family history of cardiac death in his dad at 48, in a pt with stroke at 34, I am going to go ahead and consult Cardiology to see him.   His TSH is elevated, will defer workup/treatment to the hospitalist.      MD BEATRIS Coleman/[email or web address]  D:  10/01/2021 22:29  T:  10/02/2021 1:08  JOB #:  [id number]

## 2021-10-02 NOTE — PROGRESS NOTES
Bedside RN performed patient education and medication education. Discharge concerns initiated and discussed with patient, including clarification on \"who\" assists the patient at their home and instructions for when the home going patient should call their provider after discharge. Opportunity for questions and clarification was provided. Patient receptive to education: YES  Patient stated: verbal w teachback  Barriers to Education: n/a  Diagnosis Education given:  YES    Length of stay: 0  Expected Day of Discharge: 0  Ask if they have \"Help at Home\" & add to white board?   YES    Education Day #: 0    Medication Education Given:  YES  M in the box Medication name: aspirin, levothyroxine, lipitos    Pt aware of HCAHPS survey: YES    Stroke Education documented in Patient Education: YES  Core Measures Documented in [facility]:  Risk Factors: YES  Warning signs of stroke: YES  When to Activate 911: YES  Medication Education for Risk Factors: YES  Smoking cessation if applicable: YES  Written Education Given:  YES    Discharge NIH Completed: YES  Score: 1    BRAINS: YES    Follow Up Appointment Made: YES  Date/Time if applicable: follow up w PCP/CM to call pt on Monday to set up

## 2021-10-02 NOTE — PROGRESS NOTES
[facility] Adult  Hospitalist Group                                                                                          Hospitalist Progress Note  Yash Yu MD  Answering service: [phone] from in house phone        Date of Service:  10/2/2021  NAME:  Alex Aguilera  :  1990  MRN:  [id number]      Admission Summary:   Alex Aguilera is a 35 y.o. male who presents with left arm numbness     History was primarily obtained from the patient     Patient reports he went to bed at 10 PM last night, was feeling well, reports that he woke up with numbness on the medial side of his left arm going up to his triceps. Patient reports that he felt some weakness in his left arm, got concerned and decided to come to the hospital.  Patient denies any other complaints or problems. Patient was seen in the ER and was requested to be admitted to the hospitalist service. Interval history / Subjective: Follow up left arm numbness. Patient seen and examined at the bedside. Labs, images and notes reviewed  Discussed with nursing staff, orders reviewed. Plan discussed with patient/Family  Left arm numbness much improved. Otherwise feeling well. Denied any weakness. No other concerns or overnight events. Getting echocardiogram done. D/W neurology team.  Mother at bedside. Counseled for thyroid profile monitoring with PCP in 6-8 weeks. Counseled for THC/CBD abstinence. Counseled for lifestyle modifications at length. Counseled for medications at length. Answered all his questions and concerns. Assessment & Plan:     #Left arm numbness, due to subacute right parietal CVA  #Hypokalemia  #Marijuana abuse  #Family history of sudden cardiac death at young age along with spinal stroke in sister at 68-year-old  #Subclinical hypothyroidism. Elevated TSH at 6.6 on admission, repeat check remains elevated >9. Free T4 and T3 WNL.     -Admitted to inpatient, telemetry bed  -Continue aspirin 81 mg, Lipitor 40 mg  -Neurology consult. Appreciate recommendations. Outpatient follow-up in 4 to 6 weeks  -MRI brain noted. -Echocardiogram done 10/1/2021. Noted. Grossly unremarkable.  -Cardiology consult for neurology recommendation for consideration of MARCELO. Appreciate recommendations. 30-day cardiac event monitor given per cardiology.  -Outpatient cardiology follow-up to reassess any need for MARCELO  -Hypercoagulable work-up ordered and collected, awaiting. Defer further follow-up with neurology outpatient.  -Counseled patient for marijuana abstinence  -Levothyroxine 25 mcg daily. Repeat thyroid profile in 6 to 8 weeks with PCP.  -Monitor lytes along with CBC, CMP    Code status: Full code  DVT prophylaxis: SCDs    Care Plan discussed with: Patient/Family, Nurse and   Anticipated Disposition: Home w/Family  Anticipated Discharge: DC home today. Hospital Problems  Never Reviewed        Codes Class Noted POA    TIA (transient ischemic attack) ICD-10-CM: G45.9  ICD-9-CM: 435.9  9/30/2021 Unknown                Review of Systems:   A comprehensive review of systems was negative except for that written in the HPI. Vital Signs:    Last 24hrs VS reviewed since prior progress note. Most recent are:  Visit Vitals  /85   Pulse 68   Temp 97.7 °F (36.5 °C)   Resp 12   Ht 5' 11\" (1.803 m)   Wt 91 kg (200 lb 9.9 oz)   SpO2 97%   BMI 27.98 kg/m²       No intake or output data in the 24 hours ending 10/02/21 1225     Physical Examination:     I had a face to face encounter with this patient and independently examined them on 10/2/2021 as outlined below:          Constitutional:  No acute distress, cooperative, pleasant    ENT:  Oral mucosa moist, oropharynx benign. Resp:  CTA bilaterally. No wheezing/rhonchi/rales. No accessory muscle use   CV:  Regular rhythm, normal rate, no murmurs, gallops, rubs    GI:  Soft, non distended, non tender.  normoactive bowel sounds, no hepatosplenomegaly Musculoskeletal:  No edema, warm, 2+ pulses throughout    Neurologic:  Moves all extremities. AAOx3, CN II-XII reviewed. Mild left lateral aspect numbness. No weakness or motor disparity bilaterally in upper or lower extremities. Data Review:    Review and/or order of clinical lab test  Review and/or order of tests in the radiology section of CPT  Review and/or order of tests in the medicine section of CPT    MRI BRAIN W WO CONT    Result Date: 9/30/2021  Small/punctate cortical subacute infarction of the  right parietal lobe. There is no associated hemorrhage, midline shift or mass effect. No intracranial mass, hemorrhage . MRI CERV SPINE W WO CONT    Result Date: 9/30/2021  Essentially normal MRI of the cervical spine. No evidence of cervical cord demyelinating disease. No evidence of canal or foraminal compromise. CT CODE NEURO HEAD WO CONTRAST    Result Date: 9/30/2021  No acute findings. CTA HEAD NECK W CONT    Result Date: 9/30/2021  No evidence of large vessel occlusion or hemodynamically significant carotid stenosis. Labs:     Recent Labs     10/02/21  0215 10/01/21  0312   WBC 8.3 7.2   HGB 14.3 12.5   HCT 44.1 38.0    228     Recent Labs     10/02/21  0215 10/01/21  0312 09/30/21  0408    144 140   K 3.9 3.6 3.3*    114* 107   CO2 27 26 28   BUN 12 10 14   CREA 0.97 0.83 1.19   GLU 91 85 110*   CA 9.0 7.6* 9.4   MG  --  1.8 2.1   PHOS  --  3.2  --      Recent Labs     09/30/21  0408   ALT 36   AP 69   TBILI 0.4   TP 8.0   ALB 4.3   GLOB 3.7     No results for input(s): INR, PTP, APTT, INREXT, INREXT in the last 72 hours. No results for input(s): FE, TIBC, PSAT, FERR in the last 72 hours. No results found for: FOL, RBCF   No results for input(s): PH, PCO2, PO2 in the last 72 hours.   Recent Labs     09/30/21  1836 09/30/21  1129 09/30/21  0408   CPK 91 150 131   TROIQ <0.05 <0.05  --      Lab Results   Component Value Date/Time    Cholesterol, total 131 10/01/2021 03:12 AM    HDL Cholesterol 35 10/01/2021 03:12 AM    LDL, calculated 74.2 10/01/2021 03:12 AM    Triglyceride 109 10/01/2021 03:12 AM    CHOL/HDL Ratio 3.7 10/01/2021 03:12 AM     Lab Results   Component Value Date/Time    Glucose (POC) 109 09/30/2021 04:27 AM     No results found for: COLOR, APPRN, SPGRU, REFSG, MILY, PROTU, GLUCU, KETU, BILU, UROU, SHUBHAM, LEUKU, GLUKE, EPSU, BACTU, WBCU, RBCU, CASTS, UCRY      Medications Reviewed:     Current Facility-Administered Medications   Medication Dose Route Frequency    levothyroxine (SYNTHROID) tablet 25 mcg  25 mcg Oral ACB    hydrOXYzine HCL (ATARAX) tablet 10 mg  10 mg Oral TID PRN    acetaminophen (TYLENOL) tablet 650 mg  650 mg Oral Q4H PRN    Or    acetaminophen (TYLENOL) solution 650 mg  650 mg Per NG tube Q4H PRN    Or    acetaminophen (TYLENOL) suppository 650 mg  650 mg Rectal Q4H PRN    aspirin chewable tablet 81 mg  81 mg Oral DAILY    atorvastatin (LIPITOR) tablet 40 mg  40 mg Oral QHS    famotidine (PEPCID) tablet 20 mg  20 mg Oral Q12H     ______________________________________________________________________  EXPECTED LENGTH OF STAY: - - -  ACTUAL LENGTH OF STAY:          0                 Keiry Mcnally MD

## 2021-10-02 NOTE — PROGRESS NOTES
MARI:  Home today. Was advised by nurse via phonecall that pt is requesting a [facility] physician and discharging physician wanted CM to facilitate. Unable to set this up over weekend. Pt is alert/oriented and independent. Did not have a PCP prior to this admission for positive CVA. CM requested assistance from CM specialist and also TL to follow up on Monday. Pt is aware of the plan.     CLARITZA Spence

## 2021-10-03 LAB
B2 GLYCOPROT1 IGA SER-ACNC: <9 GPI IGA UNITS (ref 0–25)
B2 GLYCOPROT1 IGG SER-ACNC: <9 GPI IGG UNITS (ref 0–20)
B2 GLYCOPROT1 IGM SER-ACNC: <9 GPI IGM UNITS (ref 0–32)
PROT C AG PPP IA-ACNC: 95 % (ref 60–150)

## 2021-10-04 NOTE — PROGRESS NOTES
Hospital follow-up PCP transitional care appointment has been scheduled with Dr. Farheen Mast for Thursday, 10/7/21 at 1:00 p.m. Spoke with patient to approved for Thursday, 10/7. Tried calling back to provide appointment information, received voice mail. Left message with call back information. Shmuel Watson, Care Management Specialist.    Received call back from patient. Provided new patient appointment information.   Shmuel Watson, Care Management Specialist.

## 2021-10-05 LAB — F5 GENE MUT ANL BLD/T: NORMAL

## 2021-10-06 NOTE — PROGRESS NOTES
Hypercoag panel reviewed - Unremarkable. Factor II DNA is still pending. Will review with patient at 11/10/21 appt.

## 2021-10-07 ENCOUNTER — OFFICE VISIT (OUTPATIENT)
Dept: INTERNAL MEDICINE CLINIC | Age: 31
End: 2021-10-07
Payer: COMMERCIAL

## 2021-10-07 VITALS
OXYGEN SATURATION: 96 % | BODY MASS INDEX: 25.31 KG/M2 | HEIGHT: 71 IN | WEIGHT: 180.8 LBS | DIASTOLIC BLOOD PRESSURE: 74 MMHG | RESPIRATION RATE: 18 BRPM | HEART RATE: 74 BPM | TEMPERATURE: 98.2 F | SYSTOLIC BLOOD PRESSURE: 126 MMHG

## 2021-10-07 DIAGNOSIS — G45.9 TIA (TRANSIENT ISCHEMIC ATTACK): ICD-10-CM

## 2021-10-07 DIAGNOSIS — Z11.59 NEED FOR HEPATITIS C SCREENING TEST: ICD-10-CM

## 2021-10-07 DIAGNOSIS — Z00.00 VISIT FOR WELL MAN HEALTH CHECK: Primary | ICD-10-CM

## 2021-10-07 PROCEDURE — 99214 OFFICE O/P EST MOD 30 MIN: CPT | Performed by: FAMILY MEDICINE

## 2021-10-07 PROCEDURE — 99395 PREV VISIT EST AGE 18-39: CPT | Performed by: FAMILY MEDICINE

## 2021-10-07 NOTE — PROGRESS NOTES
Chief Complaint   Patient presents with    New Patient     he is a 35y.o. year old male who presents for {EVALUATION/FOLLOW-UP:07250::\"follow-up\"} of ***    Reviewed and agree with Nurse Note and duplicated in this note. Reviewed PmHx, RxHx, FmHx, SocHx, AllgHx and updated and dated in the chart. No family history on file. No past medical history on file. Social History     Socioeconomic History    Marital status: SINGLE     Spouse name: Not on file    Number of children: Not on file    Years of education: Not on file    Highest education level: Not on file     Social Determinants of Health     Financial Resource Strain:     Difficulty of Paying Living Expenses:    Food Insecurity:     Worried About Running Out of Food in the Last Year:     [address] in the Last Year:    Transportation Needs:     Lack of Transportation (Medical):  Lack of Transportation (Non-Medical):    Physical Activity:     Days of Exercise per Week:     Minutes of Exercise per Session:    Stress:     Feeling of Stress :    Social Connections:     Frequency of Communication with Friends and Family:     Frequency of Social Gatherings with Friends and Family:     Attends Anglican Services:     Active Member of Clubs or Organizations:     Attends Club or Organization Meetings:     Marital Status:         Review of Systems - negative except as listed above  {ros master:057852}    Objective: There were no vitals filed for this visit. Physical Examination: {male adult master:83938}     Assessment/ Plan:   {There are no diagnoses linked to this encounter. (Refresh or delete this SmartLink)}       {MU BMI REASON:[id number]}     I have discussed the diagnosis with the patient and the intended plan as seen in the above orders. The patient has received an after-visit summary and questions were answered concerning future plans.      Medication Side Effects and Warnings were discussed with patient,  Patient Labs were reviewed and or requested, and  Patient Past Records were reviewed and or requested  yes       Pt agrees to call or return to clinic and/or go to closest ER with any worsening of symptoms. This may include, but not limited to increased fever (>100.4) with NSAIDS or Tylenol, increased edema, confusion, rash, worsening of presenting symptoms. Please note that this dictation was completed with [employer], the computer voice recognition software. Quite often unanticipated grammatical, syntax, homophones, and other interpretive errors are inadvertently transcribed by the computer software. Please disregard these errors. Please excuse any errors that have escaped final proofreading. Thank you.

## 2021-10-07 NOTE — PROGRESS NOTES
Chief Complaint   Patient presents with    New Patient     Patient admitted to [facility] on 9/30/21 for TIA. he is a 35y.o. year old male who presents for evaluation of TIA which occurred on 930, patient was hospitalized for 3 days. Patient went to sleep the night before admission to wake up with left arm numbness and proceed to the hospital.  There he was diagnosed with subacute right parietal CVA, hypokalemia, marijuana abuse, family history of sudden cardiac death at a young age along with spinal stroke in sister at 32years old, subclinical hypothyroidism. Prior to this she had no medical history and has not followed up with a doctor in quite a while. Currently he feels much better and has no symptoms and complains of no weakness today. Reviewed and agree with Nurse Note and duplicated in this note. Reviewed PmHx, RxHx, FmHx, SocHx, AllgHx and updated and dated in the chart. Family History   Problem Relation Age of Onset    Hypertension Mother     Thyroid Cancer Mother     Parkinson's Disease Mother     Stroke Maternal Grandmother     Stroke Maternal Grandfather      History reviewed. No pertinent past medical history. Social History     Socioeconomic History    Marital status: SINGLE     Spouse name: Not on file    Number of children: Not on file    Years of education: Not on file    Highest education level: Not on file   Tobacco Use    Smoking status: Never Smoker    Smokeless tobacco: Never Used   Vaping Use    Vaping Use: Never used   Substance and Sexual Activity    Alcohol use:  Yes     Alcohol/week: 2.0 standard drinks     Types: 2 Standard drinks or equivalent per week    Drug use: Yes     Types: Marijuana    Sexual activity: Yes     Social Determinants of Health     Financial Resource Strain:     Difficulty of Paying Living Expenses:    Food Insecurity:     Worried About Running Out of Food in the Last Year:     [address] in the Last Year:    Transportation Needs:     Lack of Transportation (Medical):  Lack of Transportation (Non-Medical):    Physical Activity:     Days of Exercise per Week:     Minutes of Exercise per Session:    Stress:     Feeling of Stress :    Social Connections:     Frequency of Communication with Friends and Family:     Frequency of Social Gatherings with Friends and Family:     Attends Judaism Services:     Active Member of Clubs or Organizations:     Attends Club or Organization Meetings:     Marital Status:         Review of Systems - negative except as listed above      Objective:     Vitals:    10/07/21 1259   BP: 126/74   Pulse: 74   Resp: 18   Temp: 98.2 °F (36.8 °C)   TempSrc: Oral   SpO2: 96%   Weight: 180 lb 12.8 oz (82 kg)   Height: 5' 11\" (1.803 m)       Physical Examination: General appearance - alert, well appearing, and in no distress  Eyes - pupils equal and reactive, extraocular eye movements intact  Ears - bilateral TM's and external ear canals normal  Nose - normal and patent, no erythema, discharge or polyps  Mouth - mucous membranes moist, pharynx normal without lesions  Neck - supple, no significant adenopathy  Chest - clear to auscultation, no wheezes, rales or rhonchi, symmetric air entry  Heart - normal rate, regular rhythm, normal S1, S2, no murmurs, rubs, clicks or gallops  Abdomen - soft, nontender, nondistended, no masses or organomegaly  Musculoskeletal - no joint tenderness, deformity or swelling  Extremities - peripheral pulses normal, no pedal edema, no clubbing or cyanosis  Skin - normal coloration and turgor, no rashes, no suspicious skin lesions noted     Assessment/ Plan:   Diagnoses and all orders for this visit:    1. Visit for well man health check  -     LIPID PANEL; Future  -     URINALYSIS W/ RFLX MICROSCOPIC; Future    2. TIA (transient ischemic attack)  -     METABOLIC PANEL, COMPREHENSIVE; Future  -     CBC W/O DIFF; Future    3.  Need for hepatitis C screening test  -     HEPATITIS C AB; Future                I have discussed the diagnosis with the patient and the intended plan as seen in the above orders. The patient has received an after-visit summary and questions were answered concerning future plans. Medication Side Effects and Warnings were discussed with patient,  Patient Labs were reviewed and or requested, and  Patient Past Records were reviewed and or requested  yes     Chief Complaint   Patient presents with    New Patient     Patient admitted to [facility] on 9/30/21 for TIA. he is a 35y.o. year old male who presents for CPE. Complete Physical Exam Questions:    1. Do you follow a low fat diet? yes  2. Are you up to date on your Tdap (<10 years)? Yes  3. Have you ever had a Pneumovax vaccine (>65)? Not applicable   JEF96 Not applicable   JCJH67 Not applicable  4. Have you had Zoster vaccine (>60)? No  5. Have you had the HPV - Gardasil (13- 26)? No  6. Do you follow an exercise program?  yes  7. Do you smoke?  no If > 65 and smoker, have you had a abdominal aortic aneurysm ultrasound screen? No  8. Do you consider yourself overweight?  no  9. Is there a family history of CAD< age 48? Yes  10. Is there a family history of Cancer? No  11. Do you know your Cancer risks? No  12. Have you had a colonoscopy? No  13. Have you been tested for HIV or other STI's? No HIV today(18-66 y/o)? No   14. Have you had an EKG in the last five years(>50)? Yes  15. Have you had a PSA test done this year (50-69)? No    Reviewed and agree with Nurse Note and duplicated in this note. Reviewed PmHx, RxHx, FmHx, SocHx, AllgHx and updated and dated in the chart. Family History   Problem Relation Age of Onset    Hypertension Mother     Thyroid Cancer Mother     Parkinson's Disease Mother     Stroke Maternal Grandmother     Stroke Maternal Grandfather      History reviewed. No pertinent past medical history.    Social History     Socioeconomic History    Marital status: SINGLE Spouse name: Not on file    Number of children: Not on file    Years of education: Not on file    Highest education level: Not on file   Tobacco Use    Smoking status: Never Smoker    Smokeless tobacco: Never Used   Vaping Use    Vaping Use: Never used   Substance and Sexual Activity    Alcohol use: Yes     Alcohol/week: 2.0 standard drinks     Types: 2 Standard drinks or equivalent per week    Drug use: Yes     Types: Marijuana    Sexual activity: Yes     Social Determinants of Health     Financial Resource Strain:     Difficulty of Paying Living Expenses:    Food Insecurity:     Worried About Running Out of Food in the Last Year:     [address] in the Last Year:    Transportation Needs:     Lack of Transportation (Medical):      Lack of Transportation (Non-Medical):    Physical Activity:     Days of Exercise per Week:     Minutes of Exercise per Session:    Stress:     Feeling of Stress :    Social Connections:     Frequency of Communication with Friends and Family:     Frequency of Social Gatherings with Friends and Family:     Attends Mormonism Services:     Active Member of Clubs or Organizations:     Attends Club or Organization Meetings:     Marital Status:         Review of Systems - negative except as listed above      Objective:     Vitals:    10/07/21 1259   BP: 126/74   Pulse: 74   Resp: 18   Temp: 98.2 °F (36.8 °C)   TempSrc: Oral   SpO2: 96%   Weight: 180 lb 12.8 oz (82 kg)   Height: 5' 11\" (1.803 m)       Physical Examination: General appearance - alert, well appearing, and in no distress  Eyes - pupils equal and reactive, extraocular eye movements intact  Ears - bilateral TM's and external ear canals normal  Nose - normal and patent, no erythema, discharge or polyps  Mouth - mucous membranes moist, pharynx normal without lesions  Neck - supple, no significant adenopathy  Chest - clear to auscultation, no wheezes, rales or rhonchi, symmetric air entry  Heart - normal rate, regular rhythm, normal S1, S2, no murmurs, rubs, clicks or gallops  Abdomen - soft, nontender, nondistended, no masses or organomegaly  Neurological - alert, oriented, normal speech, no focal findings or movement disorder noted  Musculoskeletal - no joint tenderness, deformity or swelling  Extremities - peripheral pulses normal, no pedal edema, no clubbing or cyanosis  Skin - normal coloration and turgor, no rashes, no suspicious skin lesions noted       Assessment/ Plan:   Diagnoses and all orders for this visit:    1. Visit for well man health check  -     LIPID PANEL; Future  -     URINALYSIS W/ RFLX MICROSCOPIC; Future    2. TIA (transient ischemic attack)  -     METABOLIC PANEL, COMPREHENSIVE; Future  -     CBC W/O DIFF; Future    3. Need for hepatitis C screening test  -     HEPATITIS C AB; Future    Something about from [address] [facility],      Labs to be drawn: CBC, CMP, Lipid            I have discussed the diagnosis with the patient and the intended plan as seen in the above orders. The patient has received an after-visit summary and questions were answered concerning future plans. Medication Side Effects and Warnings were discussed with patient,  Patient Labs were reviewed and or requested, and  Patient Past Records were reviewed and or requested  yes         Pt agrees to call or return to clinic and/or go to closest ER with any worsening of symptoms. This may include, but not limited to increased fever (>100.4) with NSAIDS or Tylenol, increased edema, confusion, rash, worsening of presenting symptoms. Please note that this dictation was completed with [employer], the computer voice recognition software. Quite often unanticipated grammatical, syntax, homophones, and other interpretive errors are inadvertently transcribed by the computer software. Please disregard these errors. Please excuse any errors that have escaped final proofreading. Thank you.

## 2021-10-08 LAB
ALBUMIN SERPL-MCNC: 4.7 G/DL (ref 4.1–5.2)
ALBUMIN/GLOB SERPL: 2 {RATIO} (ref 1.2–2.2)
ALP SERPL-CCNC: 79 IU/L (ref 44–121)
ALT SERPL-CCNC: 24 IU/L (ref 0–44)
APPEARANCE UR: CLEAR
AST SERPL-CCNC: 15 IU/L (ref 0–40)
BILIRUB SERPL-MCNC: 0.4 MG/DL (ref 0–1.2)
BILIRUB UR QL STRIP: NEGATIVE
BUN SERPL-MCNC: 10 MG/DL (ref 6–20)
BUN/CREAT SERPL: 11 (ref 9–20)
CALCIUM SERPL-MCNC: 9.7 MG/DL (ref 8.7–10.2)
CHLORIDE SERPL-SCNC: 103 MMOL/L (ref 96–106)
CHOLEST SERPL-MCNC: 103 MG/DL (ref 100–199)
CO2 SERPL-SCNC: 25 MMOL/L (ref 20–29)
COLOR UR: YELLOW
CREAT SERPL-MCNC: 0.87 MG/DL (ref 0.76–1.27)
ERYTHROCYTE [DISTWIDTH] IN BLOOD BY AUTOMATED COUNT: 12.3 % (ref 11.6–15.4)
F2 GENE MUT ANL BLD/T: NORMAL
GLOBULIN SER CALC-MCNC: 2.3 G/DL (ref 1.5–4.5)
GLUCOSE SERPL-MCNC: 85 MG/DL (ref 65–99)
GLUCOSE UR QL: NEGATIVE
HCT VFR BLD AUTO: 40.4 % (ref 37.5–51)
HCV AB S/CO SERPL IA: <0.1 S/CO RATIO (ref 0–0.9)
HDLC SERPL-MCNC: 39 MG/DL
HGB BLD-MCNC: 14.5 G/DL (ref 13–17.7)
HGB UR QL STRIP: NEGATIVE
KETONES UR QL STRIP: NEGATIVE
LDLC SERPL CALC-MCNC: 38 MG/DL (ref 0–99)
LEUKOCYTE ESTERASE UR QL STRIP: NEGATIVE
MCH RBC QN AUTO: 30.7 PG (ref 26.6–33)
MCHC RBC AUTO-ENTMCNC: 35.9 G/DL (ref 31.5–35.7)
MCV RBC AUTO: 86 FL (ref 79–97)
MICRO URNS: NORMAL
NITRITE UR QL STRIP: NEGATIVE
PH UR STRIP: 5.5 [PH] (ref 5–7.5)
PLATELET # BLD AUTO: 247 X10E3/UL (ref 150–450)
POTASSIUM SERPL-SCNC: 4.6 MMOL/L (ref 3.5–5.2)
PROT SERPL-MCNC: 7 G/DL (ref 6–8.5)
PROT UR QL STRIP: NEGATIVE
RBC # BLD AUTO: 4.72 X10E6/UL (ref 4.14–5.8)
SODIUM SERPL-SCNC: 141 MMOL/L (ref 134–144)
SP GR UR: 1.02 (ref 1–1.03)
TRIGL SERPL-MCNC: 157 MG/DL (ref 0–149)
UROBILINOGEN UR STRIP-MCNC: 0.2 MG/DL (ref 0.2–1)
VLDLC SERPL CALC-MCNC: 26 MG/DL (ref 5–40)
WBC # BLD AUTO: 7 X10E3/UL (ref 3.4–10.8)

## 2021-10-11 NOTE — PROGRESS NOTES
Your triglycerides are bad fats were elevated but otherwise normal blood work. These fats can be elevated from recently eaten meals.   Recommend yearly follow-ups

## 2021-11-03 ENCOUNTER — OFFICE VISIT (OUTPATIENT)
Dept: NEUROLOGY | Age: 31
End: 2021-11-03
Payer: COMMERCIAL

## 2021-11-03 VITALS
HEART RATE: 69 BPM | HEIGHT: 71 IN | OXYGEN SATURATION: 98 % | WEIGHT: 184.8 LBS | DIASTOLIC BLOOD PRESSURE: 70 MMHG | BODY MASS INDEX: 25.87 KG/M2 | SYSTOLIC BLOOD PRESSURE: 120 MMHG

## 2021-11-03 DIAGNOSIS — I63.9 CEREBROVASCULAR ACCIDENT (CVA), UNSPECIFIED MECHANISM (HCC): Primary | ICD-10-CM

## 2021-11-03 PROCEDURE — 99214 OFFICE O/P EST MOD 30 MIN: CPT | Performed by: PSYCHIATRY & NEUROLOGY

## 2021-11-03 RX ORDER — ATORVASTATIN CALCIUM 40 MG/1
40 TABLET, FILM COATED ORAL
Qty: 30 TABLET | Refills: 0 | Status: SHIPPED | OUTPATIENT
Start: 2021-11-03 | End: 2021-11-30

## 2021-11-03 RX ORDER — LEVOTHYROXINE SODIUM 25 UG/1
25 TABLET ORAL
Qty: 30 TABLET | Refills: 0 | Status: SHIPPED | OUTPATIENT
Start: 2021-11-03 | End: 2021-11-30

## 2021-11-03 NOTE — LETTER
11/3/2021 Patient: Alex Aguilera YOB: 1990 Date of Visit: 11/3/2021 Opal Hirsch MD 
[facility] [address] [employer] Dear Opal Hirsch MD, Thank you for referring Mr. Alex Aguilera to [address] [place] [address] for evaluation. My notes for this consultation are attached. If you have questions, please do not hesitate to call me. I look forward to following your patient along with you. Sincerely, Lia Parker MD

## 2021-11-03 NOTE — PATIENT INSTRUCTIONS
-Continue Lipitor 40mg daily x 2 months, then likely can reduce dose and follow lipid panel, goal LDL <70 
-Continue ASA 81mg daily indefinitely 
-Keep f/u with Dr. Sherrill Dimas

## 2021-11-03 NOTE — PROGRESS NOTES
Neurology Consult Note      HISTORY PROVIDED BY: patient    Chief Complaint:   Chief Complaint   Patient presents with   [facility] Stroke   [facility] Follow Up      Subjective:    Kameron Martinez is a 35 y.o. 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 No known stroke risk factors, but with a family history of sudden cardiac death in his father at 48 and spinal cord infarct in sister at 32. He does smoke marijuana several times a day. Exam was nonfocal except for decreased sensation in the left arm. Started ASA 81mg daily and Lipitor 40mg daily. LDL 74.2, HgbA1C 5.2. CTA H/N was normal. TTEcho was normal without PFO. Placed on 30 day event monitor by cardiology with outpt f/u to reassess need for MARCELO. Hypercoag panel was neg. TSH was elevated, started on levothyroxine 25mg daily. He returns for f/u. He completed the cardiac monitor on 10/30/21, had one episode of 2nd degree, type 1 AVB with HR 41. He has had a tough month from a personal standpoint. He started back to work 2 weeks ago. The numbness in left arm is localized to lateral forearm, down to maybe 5%, doesn't always notice it. No new stroke sxs. Has tinnitus in left ear, seems worse. He ran out of atorvastatin and levothyroxine on Monday. Was not certain who should refill this. Has f/u with Dr. Marsha Bethea in two weeks. Past Medical History:   Diagnosis Date    Hypothyroidism     Stroke (cerebrum) ([facility] [address] 09/30/2021    tiny right cortical infarct with left arm numbness. No past surgical history on file.    Social History     Socioeconomic History    Marital status: SINGLE     Spouse name: Not on file    Number of children: Not on file    Years of education: Not on file    Highest education level: Not on file   Occupational History    Occupation: IT   Tobacco Use    Smoking status: Never Smoker    Smokeless tobacco: Never Used Vaping Use    Vaping Use: Never used   Substance and Sexual Activity    Alcohol use: Yes     Alcohol/week: 2.0 standard drinks     Types: 2 Standard drinks or equivalent per week    Drug use: Yes     Types: Marijuana    Sexual activity: Yes   Other Topics Concern    Not on file   Social History Narrative    Lives in [facility] with girlfriend, recently broken up and will be moving in next two months. Social Determinants of Health     Financial Resource Strain:     Difficulty of Paying Living Expenses: Not on file   Food Insecurity:     Worried About Running Out of Food in the Last Year: Not on file    Paz of Food in the Last Year: Not on file   Transportation Needs:     Lack of Transportation (Medical): Not on file    Lack of Transportation (Non-Medical):  Not on file   Physical Activity:     Days of Exercise per Week: Not on file    Minutes of Exercise per Session: Not on file   Stress:     Feeling of Stress : Not on file   Social Connections:     Frequency of Communication with Friends and Family: Not on file    Frequency of Social Gatherings with Friends and Family: Not on file    Attends Orthodoxy Services: Not on file    Active Member of [address] [place] [address] [employer] or Organizations: Not on file    Attends Club or Organization Meetings: Not on file    Marital Status: Not on file   Intimate Partner Violence:     Fear of Current or Ex-Partner: Not on file    Emotionally Abused: Not on file    Physically Abused: Not on file    Sexually Abused: Not on file   Housing Stability:     Unable to Pay for Housing in the Last Year: Not on file    Number of [place] in the Last Year: Not on file    Unstable Housing in the Last Year: Not on file     Family History   Problem Relation Age of Onset    Hypertension Mother     Thyroid Cancer Mother     Parkinson's Disease Mother     Heart Attack Father         54yo    Stroke Maternal Grandmother     Stroke Maternal Grandfather     Other Sister         Spinal cord infarct age 27yo         Objective:   Review of Systems   HENT: Positive for tinnitus (High pitch hum, left ear). Psychiatric/Behavioral: Positive for depression. The patient is nervous/anxious. All other systems reviewed and are negative. No Known Allergies     Meds:  Outpatient Medications Prior to Visit   Medication Sig Dispense Refill    aspirin 81 mg chewable tablet Take 1 Tablet by mouth daily. 30 Tablet 0    atorvastatin (LIPITOR) 40 mg tablet Take 1 Tablet by mouth nightly. 30 Tablet 0    levothyroxine (SYNTHROID) 25 mcg tablet Take 1 Tablet by mouth Daily (before breakfast). 30 Tablet 0    magnesium citrate solution Take 296 mL by mouth daily as needed.  TURMERIC PO Take  by mouth daily. Unknown dose      multivitamin (ONE A DAY) tablet Take 1 Tablet by mouth daily. No facility-administered medications prior to visit. Imaging:  MRI Results (most recent):  Results from [place] encounter on 09/30/21    MRI CERV SPINE W WO CONT    Narrative  EXAM: MRI CERV SPINE W WO CONT  History: Left arm numbness  INDICATION: left arm numbness. COMPARISON: None    TECHNIQUE: MR imaging of the cervical spine was performed using the following  sequences: sagittal T1, T2, STIR;  axial T2, T1 prior to and following contrast  administration. CONTRAST: 17 mL of ProHance. FINDINGS:    There is normal alignment of the cervical spine. Vertebral body heights are  maintained. Marrow signal is normal.    The craniocervical junction is intact. The course, caliber, and signal intensity  of the spinal cord are normal. There is no pathologic intrathecal enhancement. The paraspinal soft tissues are within normal limits. C2-C3: No herniation or stenosis. C3-C4: No herniation or stenosis. Minimal disc desiccation. C4-C5: No herniation or stenosis. Minimal disc desiccation. C5-C6: No herniation or stenosis. C6-C7: No herniation or stenosis.     C7-T1: No herniation or stenosis. Impression  Essentially normal MRI of the cervical spine. No evidence of cervical cord demyelinating disease. No evidence of canal or  foraminal compromise. CT Results (most recent):   Results from Hospital Encounter encounter on 09/30/21    CTA HEAD NECK W CONT    Narrative  *PRELIMINARY REPORT*    No large vessel occlusion. Preliminary report was provided by Dr. Xiao Spicer, the on-call radiologist, at  06:34    Final report to follow. *END PRELIMINARY REPORT*    CLINICAL HISTORY: Left arm numbness    EXAMINATION:  CT ANGIOGRAPHY HEAD AND NECK    COMPARISON: None    TECHNIQUE:  Following the uneventful administration of iodinated contrast  material, axial CT angiography of the head and neck was performed. Delayed axial  images through the head were also obtained. Coronal and sagittal reconstructions  were obtained. Manual postprocessing of images was performed. 3-D  Sagittal  maximal intensity projection images were obtained. 3-D Coronal maximal  intensity projections were obtained. CT dose reduction was achieved through use  of a standardized protocol tailored for this examination and automatic exposure  control for dose modulation. This study was analyzed by the [address] ai algorithm. FINDINGS:    Delayed contrast-enhanced head CT:    The ventricles are midline without hydrocephalus. There is no acute intra or  extra-axial hemorrhage. The basal cisterns are clear. The paranasal sinuses are  clear. CTA NECK:    Great vessels: Four-vessel aortic arch with patent origins. Separate origin of  the left vertebral artery from the aortic arch. Right subclavian artery: Patent    Left subclavian artery: Patent    Right common carotid artery: Patent    Left common carotid artery: Patent    Cervical right internal carotid artery: Patent with no significant stenosis by  NASCET criteria. Cervical left internal carotid artery: Patent with no significant stenosis by  NASCET criteria.     Right vertebral artery: Patent    Left vertebral artery: Patent    The lung apices are clear. The thyroid is homogeneous. No cervical  lymphadenopathy. CTA HEAD:    Right cavernous internal carotid artery: Patent    Left cavernous internal carotid artery: Patent    Anterior cerebral arteries: Patent    Anterior communicating artery: Patent    Right middle cerebral artery: Patent    Left middle cerebral artery: Patent    Posterior communicating arteries: Patent    Posterior cerebral arteries: Patent    Basilar artery: Patent    Distal vertebral arteries: Patent    No evidence for intracranial aneurysm or hemodynamically significant stenosis. Impression  No evidence of large vessel occlusion or hemodynamically significant carotid  stenosis.     Reviewed records in [facility] and [facility] tab today    Lab Review   Results for orders placed or performed in visit on 10/07/21   URINALYSIS W/ RFLX MICROSCOPIC   Result Value Ref Range    Specific Gravity 1.022 1.005 - 1.030    pH (UA) 5.5 5.0 - 7.5    Color Yellow Yellow    Appearance Clear Clear    Leukocyte Esterase Negative Negative    Protein Negative Negative/Trace    Glucose Negative Negative    Ketone Negative Negative    Blood Negative Negative    Bilirubin Negative Negative    Urobilinogen 0.2 0.2 - 1.0 mg/dL    Nitrites Negative Negative    Microscopic Examination Comment    LIPID PANEL   Result Value Ref Range    Cholesterol, total 103 100 - 199 mg/dL    Triglyceride 157 (H) 0 - 149 mg/dL    HDL Cholesterol 39 (L) >39 mg/dL    VLDL, calculated 26 5 - 40 mg/dL    LDL, calculated 38 0 - 99 mg/dL   HEPATITIS C AB   Result Value Ref Range    Hep C Virus Ab <0.1 0.0 - 0.9 s/co ratio   CBC W/O DIFF   Result Value Ref Range    WBC 7.0 3.4 - 10.8 x10E3/uL    RBC 4.72 4.14 - 5.80 x10E6/uL    HGB 14.5 13.0 - 17.7 g/dL    HCT 40.4 37.5 - 51.0 %    MCV 86 79 - 97 fL    MCH 30.7 26.6 - 33.0 pg    MCHC 35.9 (H) 31.5 - 35.7 g/dL    RDW 12.3 11.6 - 15.4 %    PLATELET 149 364 - 854 [id number]/FB   METABOLIC PANEL, COMPREHENSIVE   Result Value Ref Range    Glucose 85 65 - 99 mg/dL    BUN 10 6 - 20 mg/dL    Creatinine 0.87 0.76 - 1.27 mg/dL    GFR est non- >59 mL/min/1.73    GFR est  >59 mL/min/1.73    BUN/Creatinine ratio 11 9 - 20    Sodium 141 134 - 144 mmol/L    Potassium 4.6 3.5 - 5.2 mmol/L    Chloride 103 96 - 106 mmol/L    CO2 25 20 - 29 mmol/L    Calcium 9.7 8.7 - 10.2 mg/dL    Protein, total 7.0 6.0 - 8.5 g/dL    Albumin 4.7 4.1 - 5.2 g/dL    GLOBULIN, TOTAL 2.3 1.5 - 4.5 g/dL    A-G Ratio 2.0 1.2 - 2.2    Bilirubin, total 0.4 0.0 - 1.2 mg/dL    Alk. phosphatase 79 44 - 121 IU/L    AST (SGOT) 15 0 - 40 IU/L    ALT (SGPT) 24 0 - 44 IU/L        Exam:  Visit Vitals  /70   Pulse 69   Ht 5' 11\" (1.803 m)   Wt 184 lb 12.8 oz (83.8 kg)   SpO2 98%   BMI 25.77 kg/m²     General:  Alert, cooperative, no distress. Head:  Normocephalic, without obvious abnormality, atraumatic. Respiratory:  Heart:   Non labored breathing  Regular rate and rhythm, no murmurs   Neck:      Extremities: Warm, no cyanosis or edema. Pulses: 2+ radial pulses. Neurologic:  MS: Alert and oriented x 4, speech intact. Language intact,. Attention and fund of knowledge appropriate. Recent and remote memory intact.   Cranial Nerves:  II: visual fields Full to confrontation   II: pupils Equal, round, reactive to light   II: optic disc    III,VII: ptosis none   III,IV,VI: extraocular muscles  EOMI, no nystagmus or diplopia   V: facial light touch sensation  normal   VII: facial muscle function   symmetric   VIII: hearing intact   IX: soft palate elevation     XI: trapezius strength     XI: sternocleidomastoid strength    XII: tongue       Motor: normal bulk and tone, no tremor              Strength: 5/5 throughout, no PD  Sensory: intact to LT, PP  Coordination: FTN and HTS intact, OSEI intact  Gait:   Reflexes: 2+ symmetric           Assessment/Plan   Pt is a 35 y.o. right handed male hospitalized 9/30/21 -10/2/21 after presenting with left arm numbness, found to have a tiny right cortical infarct. No known stroke risk factors, but with a family history of sudden cardiac death in his father at 48 and spinal cord infarct in sister at 32. He does smoke marijuana several times a day. LDL 74.2, HgbA1C 5.2. CTA H/N was normal. TTEcho was normal without PFO. Placed on 30 day event monitor by cardiology with outpt f/u to reassess need for MARCELO, had one episode of 2nd degree, type 1 AVB with HR 41. Hypercoag panel was neg. TSH was elevated, started on levothyroxine 25mg daily. Exam is non-focal.  Work-up/test results reviewed with the patient.  -Continue ASA 81mg daily indefinitely  -Continue Lipitor 40mg daily x 2 months, then may be able to decrease dose with goal LDL less than 70.   -Patient was going to contact his PCP for refills of his atorvastatin and levothyroxine.  -Patient has cardiac follow-up with Dr. Jesse Ojeda in 2 weeks  -Follow-up in neurology as needed      ICD-10-CM ICD-9-CM    1. Cerebrovascular accident (CVA), unspecified mechanism ([facility] [address]  I63.9 434.91        Signed:   Patricia Olivier MD  11/3/2021

## 2021-11-12 ENCOUNTER — OFFICE VISIT (OUTPATIENT)
Dept: CARDIOLOGY CLINIC | Age: 31
End: 2021-11-12
Payer: COMMERCIAL

## 2021-11-12 VITALS
RESPIRATION RATE: 16 BRPM | HEIGHT: 71 IN | SYSTOLIC BLOOD PRESSURE: 120 MMHG | WEIGHT: 179 LBS | BODY MASS INDEX: 25.06 KG/M2 | OXYGEN SATURATION: 98 % | DIASTOLIC BLOOD PRESSURE: 80 MMHG | HEART RATE: 78 BPM

## 2021-11-12 DIAGNOSIS — I63.9 ACUTE CVA (CEREBROVASCULAR ACCIDENT) (HCC): Primary | ICD-10-CM

## 2021-11-12 PROCEDURE — 99213 OFFICE O/P EST LOW 20 MIN: CPT | Performed by: INTERNAL MEDICINE

## 2021-11-12 NOTE — PROGRESS NOTES
[facility] [place]:   [phone]    History of Present Illness:   Mr. Citlali Whelan is a 36 yo M with recent hospitalization in September for left arm numbness found to have a tiny right cortical infarct. He had workup including transesophageal echocardiogram that was normal with no PFO. He was followed outpatient by Dr. Mynor Calderon. He also wore a cardiac monitor outpatient that was overall normal, but did note one episode of second degree AV block type 1. Overall, the numbness in his forearm is much less and almost resolved. He denies any current or exertional chest pain, shortness of breath, palpitations, lightheadedness or dizziness. He is compensated on exam with clear lungs and no lower extremity edema. Soc hx. No tobacco use  Fam hx. No early CAD  Assessment and Plan:  Stroke. No obvious cardiac contribution and his echocardiogram was normal with no PFO. A MARCELO is not indicated at this time. He had a 30 day event monitor with no evidence of significant arrhythmia. There was just one episode of second degree AV block type 1, which is consistent with normal variant, but no atrial fibrillation. No additional cardiac evaluation is indicated. He can follow here on an as needed basis. He  has a past medical history of Hypothyroidism and Stroke (cerebrum) ([facility] [address] (09/30/2021). All other systems negative except as above. PE  Vitals:    11/12/21 1523   BP: 120/80   Pulse: 78   Resp: 16   SpO2: 98%   Weight: 179 lb (81.2 kg)   Height: 5' 11\" (1.803 m)    Body mass index is 24.97 kg/m².    General appearance - alert, well appearing, and in no distress  Mental status - affect appropriate to mood  Eyes - sclera anicteric, moist mucous membranes  Neck - supple, no JVD  Chest - clear to auscultation, no wheezes, rales or rhonchi  Heart - normal rate, regular rhythm, normal S1, S2, no murmurs, rubs, clicks or gallops  Abdomen - soft, nontender, nondistended, no masses or organomegaly  Neurological - no focal deficit  Extremities - peripheral pulses normal, no pedal edema      Recent Labs:  Lab Results   Component Value Date/Time    Cholesterol, total 103 10/07/2021 12:00 AM    HDL Cholesterol 39 (L) 10/07/2021 12:00 AM    LDL, calculated 38 10/07/2021 12:00 AM    LDL, calculated 74.2 10/01/2021 03:12 AM    Triglyceride 157 (H) 10/07/2021 12:00 AM    CHOL/HDL Ratio 3.7 10/01/2021 03:12 AM     Lab Results   Component Value Date/Time    Creatinine 0.87 10/07/2021 12:00 AM     Lab Results   Component Value Date/Time    BUN 10 10/07/2021 12:00 AM     Lab Results   Component Value Date/Time    Potassium 4.6 10/07/2021 12:00 AM     Lab Results   Component Value Date/Time    Hemoglobin A1c 5.2 10/01/2021 03:12 AM     Lab Results   Component Value Date/Time    HGB 14.5 10/07/2021 12:00 AM     Lab Results   Component Value Date/Time    PLATELET 420 49/07/2003 12:00 AM       Reviewed:  Past Medical History:   Diagnosis Date    Hypothyroidism     Stroke (cerebrum) ([facility] [address] 09/30/2021    tiny right cortical infarct with left arm numbness. Social History     Tobacco Use   Smoking Status Never Smoker   Smokeless Tobacco Never Used     Social History     Substance and Sexual Activity   Alcohol Use Yes    Alcohol/week: 2.0 standard drinks    Types: 2 Standard drinks or equivalent per week     No Known Allergies    Current Outpatient Medications   Medication Sig    atorvastatin (LIPITOR) 40 mg tablet Take 1 Tablet by mouth nightly.  levothyroxine (SYNTHROID) 25 mcg tablet Take 1 Tablet by mouth Daily (before breakfast).  aspirin 81 mg chewable tablet Take 1 Tablet by mouth daily.  magnesium citrate solution Take 296 mL by mouth daily as needed.  TURMERIC PO Take  by mouth daily. Unknown dose    multivitamin (ONE A DAY) tablet Take 1 Tablet by mouth daily. No current facility-administered medications for this visit.        Keyana Au MD  [facility] [place]  [address], [address] [facility], [address] [place] [address]

## 2021-11-30 RX ORDER — ATORVASTATIN CALCIUM 40 MG/1
40 TABLET, FILM COATED ORAL
Qty: 30 TABLET | Refills: 0 | Status: SHIPPED | OUTPATIENT
Start: 2021-11-30

## 2021-11-30 RX ORDER — LEVOTHYROXINE SODIUM 25 UG/1
TABLET ORAL
Qty: 30 TABLET | Refills: 0 | Status: SHIPPED | OUTPATIENT
Start: 2021-11-30 | End: 2022-01-01

## 2022-01-01 RX ORDER — LEVOTHYROXINE SODIUM 25 UG/1
TABLET ORAL
Qty: 30 TABLET | Refills: 0 | Status: SHIPPED | OUTPATIENT
Start: 2022-01-01 | End: 2022-01-31

## 2022-01-31 RX ORDER — LEVOTHYROXINE SODIUM 25 UG/1
TABLET ORAL
Qty: 30 TABLET | Refills: 0 | Status: SHIPPED | OUTPATIENT
Start: 2022-01-31 | End: 2022-03-03

## 2022-03-03 RX ORDER — LEVOTHYROXINE SODIUM 25 UG/1
TABLET ORAL
Qty: 30 TABLET | Refills: 0 | Status: SHIPPED | OUTPATIENT
Start: 2022-03-03 | End: 2022-03-31

## 2022-03-18 PROBLEM — G45.9 TIA (TRANSIENT ISCHEMIC ATTACK): Status: ACTIVE | Noted: 2021-09-30

## 2022-03-31 RX ORDER — LEVOTHYROXINE SODIUM 25 UG/1
TABLET ORAL
Qty: 30 TABLET | Refills: 0 | Status: SHIPPED | OUTPATIENT
Start: 2022-03-31

## 2023-12-12 ENCOUNTER — HOSPITAL ENCOUNTER (OUTPATIENT)
Facility: HOSPITAL | Age: 33
Discharge: HOME | End: 2023-12-15
Payer: COMMERCIAL

## 2023-12-12 ENCOUNTER — TRANSCRIBE ORDERS (OUTPATIENT)
Facility: HOSPITAL | Age: 33
End: 2023-12-12

## 2023-12-12 DIAGNOSIS — R22.42 MASS OF LOWER LEG, LEFT: ICD-10-CM

## 2023-12-12 DIAGNOSIS — R22.42 MASS OF LOWER LEG, LEFT: Primary | ICD-10-CM

## 2023-12-12 PROCEDURE — 93971 EXTREMITY STUDY: CPT
